# Patient Record
Sex: MALE | Race: OTHER | NOT HISPANIC OR LATINO | URBAN - METROPOLITAN AREA
[De-identification: names, ages, dates, MRNs, and addresses within clinical notes are randomized per-mention and may not be internally consistent; named-entity substitution may affect disease eponyms.]

---

## 2018-06-13 ENCOUNTER — EMERGENCY (EMERGENCY)
Facility: HOSPITAL | Age: 28
LOS: 1 days | Discharge: HOME | End: 2018-06-13
Attending: STUDENT IN AN ORGANIZED HEALTH CARE EDUCATION/TRAINING PROGRAM
Payer: COMMERCIAL

## 2018-06-13 VITALS
SYSTOLIC BLOOD PRESSURE: 156 MMHG | RESPIRATION RATE: 18 BRPM | DIASTOLIC BLOOD PRESSURE: 94 MMHG | HEART RATE: 108 BPM | OXYGEN SATURATION: 100 % | TEMPERATURE: 99 F

## 2018-06-13 VITALS — HEIGHT: 70 IN | WEIGHT: 205.03 LBS

## 2018-06-13 PROCEDURE — 99282 EMERGENCY DEPT VISIT SF MDM: CPT

## 2018-06-13 PROCEDURE — 99053 MED SERV 10PM-8AM 24 HR FAC: CPT

## 2018-06-13 PROCEDURE — 99283 EMERGENCY DEPT VISIT LOW MDM: CPT | Mod: 25

## 2018-06-13 RX ORDER — IBUPROFEN 200 MG
1 TABLET ORAL
Qty: 12 | Refills: 0 | OUTPATIENT
Start: 2018-06-13 | End: 2018-06-15

## 2018-06-13 RX ORDER — METHOCARBAMOL 500 MG/1
1 TABLET, FILM COATED ORAL
Qty: 15 | Refills: 0 | OUTPATIENT
Start: 2018-06-13 | End: 2018-06-17
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
## 2018-06-13 NOTE — ED PROVIDER NOTE - SKIN WOUND TYPE
ABRASION(S)/2 tiny abrasions along the right side of face, two areas of faint erythema to forehead without tenderness
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
## 2018-12-09 ENCOUNTER — EMERGENCY (EMERGENCY)
Facility: HOSPITAL | Age: 28
LOS: 1 days | Discharge: HOME | End: 2018-12-09
Attending: EMERGENCY MEDICINE
Payer: COMMERCIAL

## 2018-12-09 VITALS
HEIGHT: 70 IN | HEART RATE: 77 BPM | WEIGHT: 225.09 LBS | TEMPERATURE: 98 F | RESPIRATION RATE: 20 BRPM | OXYGEN SATURATION: 98 % | DIASTOLIC BLOOD PRESSURE: 87 MMHG | SYSTOLIC BLOOD PRESSURE: 149 MMHG

## 2018-12-09 PROCEDURE — 73130 X-RAY EXAM OF HAND: CPT | Mod: 26,LT

## 2018-12-09 PROCEDURE — 73100 X-RAY EXAM OF WRIST: CPT | Mod: 26,LT,59

## 2018-12-09 PROCEDURE — 73130 X-RAY EXAM OF HAND: CPT

## 2018-12-09 PROCEDURE — 73110 X-RAY EXAM OF WRIST: CPT | Mod: 26,LT,76

## 2018-12-09 PROCEDURE — 73110 X-RAY EXAM OF WRIST: CPT

## 2018-12-09 PROCEDURE — 73090 X-RAY EXAM OF FOREARM: CPT

## 2018-12-09 PROCEDURE — 73100 X-RAY EXAM OF WRIST: CPT

## 2018-12-09 PROCEDURE — 99283 EMERGENCY DEPT VISIT LOW MDM: CPT

## 2018-12-09 PROCEDURE — 73090 X-RAY EXAM OF FOREARM: CPT | Mod: 26,LT

## 2018-12-09 PROCEDURE — 99285 EMERGENCY DEPT VISIT HI MDM: CPT | Mod: 25

## 2018-12-09 PROCEDURE — 25605 CLTX DST RDL FX/EPHYS SEP W/: CPT | Mod: LT

## 2018-12-09 RX ORDER — OXYCODONE AND ACETAMINOPHEN 5; 325 MG/1; MG/1
1 TABLET ORAL ONCE
Qty: 0 | Refills: 0 | Status: DISCONTINUED | OUTPATIENT
Start: 2018-12-09 | End: 2018-12-09

## 2018-12-09 RX ORDER — IBUPROFEN 200 MG
1 TABLET ORAL
Qty: 20 | Refills: 0 | OUTPATIENT
Start: 2018-12-09 | End: 2018-12-13

## 2018-12-09 RX ORDER — IBUPROFEN 200 MG
600 TABLET ORAL ONCE
Qty: 0 | Refills: 0 | Status: COMPLETED | OUTPATIENT
Start: 2018-12-09 | End: 2018-12-09

## 2018-12-09 RX ORDER — IBUPROFEN 200 MG
600 TABLET ORAL ONCE
Qty: 0 | Refills: 0 | Status: DISCONTINUED | OUTPATIENT
Start: 2018-12-09 | End: 2018-12-09

## 2018-12-09 RX ADMIN — Medication 600 MILLIGRAM(S): at 15:57

## 2018-12-09 RX ADMIN — OXYCODONE AND ACETAMINOPHEN 1 TABLET(S): 5; 325 TABLET ORAL at 17:59

## 2018-12-09 NOTE — ED PROVIDER NOTE - CARE PLAN
Principal Discharge DX:	Wrist pain Principal Discharge DX:	Wrist fracture, bilateral Principal Discharge DX:	Wrist fracture, bilateral  Assessment and plan of treatment:	Follow up with orthopedics in 24-48 hours  pain meds as ordered  b/l splints applied

## 2018-12-09 NOTE — ED ADULT NURSE REASSESSMENT NOTE - NS ED NURSE REASSESS COMMENT FT1
cast was removed and resplinted to left wrist, patient received block, unable to assess neuros on fingers again at this time.

## 2018-12-09 NOTE — PROCEDURE NOTE - NSPOSTCAREGUIDE_GEN_A_CORE
Elevate the injured extremity as instructed/Understanding of care for injured extremity verbalized/Keep the cast/splint/dressing clean and dry/Verbal/written post procedure instructions were given to patient/caregiver/Instructed patient/caregiver regarding signs and symptoms of infection

## 2018-12-09 NOTE — ED PROVIDER NOTE - ATTENDING CONTRIBUTION TO CARE
28 year old with hx c/o left wrist pain s/p fall on outstretched hand previously diagnosed with fracture and splinted c/o continued pain and new right hand and wrist pain.  Patient with DROM on exam and tenderness on palpation.  Xray shows new fractures to right hand.  Orthopedics called covering plastics hand consults.  Previous left wrist injury shows dislocation which was reduced by Ortho.  Both extremities were splinted and patient instructed to follow-up as outpatient.

## 2018-12-09 NOTE — PROCEDURE NOTE - NSPERIPVASCEVA_GEN_A_CORE
fingers/toes warm to touch/no cyanosis of extremity/capillary refill time < 2 seconds/paresthesia/swelling

## 2018-12-09 NOTE — ED ADULT NURSE NOTE - OBJECTIVE STATEMENT
patient complaining of right wrist pain status post fall 2 days ago.  Patient has splint to left wrist from [facility], stating numbness to 3/4/5 fingers, no x rays right wrist as per patient

## 2018-12-09 NOTE — ED PROVIDER NOTE - MEDICAL DECISION MAKING DETAILS
b/l wrist pain s/p fall with known L wrist FX in splint: will XR, remove splint, consider ortho eval

## 2018-12-09 NOTE — PROCEDURE NOTE - NSPERIPNEUEVAL_GEN_A_CORE
reports tingling to 1/2/3 digits post reduction decreased sensation throughout secondary to lidocaine hematoma block/Pre-application: Motor, sensory, and vascular responses intact in the injured extremity .

## 2018-12-09 NOTE — ED PROVIDER NOTE - MUSCULOSKELETAL MINIMAL EXAM
L wrist in splint, decreased sensation to digits 1, 2, 3, digits exposed, no erythema, no warmth, no swelling.  R wrist NTTP, to wrist, forearm, elbow, no erythema, no warmth, no swelling, cap refill < 2sec, radial pulse intact, adduction and abduction intact, skin warm and dry

## 2018-12-09 NOTE — ED ADULT TRIAGE NOTE - CHIEF COMPLAINT QUOTE
Friday lt wrist  FX in [facility], has cast.   Now rt arm, hand pain. In [employer] and was training.

## 2018-12-09 NOTE — ED PROVIDER NOTE - OBJECTIVE STATEMENT
This is a 28 year old male with c/o L wrist pain since Friday.  He reports was at the [employer] when landed on out-stretched hand.  He reports went to [facility] and was told had FX started with a "C."  He notes was splinted and began to experience numbness and tingly sensation in digits 1, 2, 3.  He reports now has pain in opposite wrist that radiates to his forearm.  He reports has been taking IBU with minimal relief.  He notes is due to follow up with Urmila tomorrow, however doesn't have an official appointment. This is a 28 year old male with c/o L wrist pain since Friday.  He reports was at the [employer] when landed on out-stretched hand b/l.  He reports went to [facility] and was told had FX started with a "C."  He reports L wrist was placed in gravity with weight, and given lidocaine block and reduced in the ER.  He notes was splinted and began to experience numbness and tingly sensation in digits 1, 2, 3 today.  He reports has pain in opposite wrist since injury x 2 days ago that radiates to his forearm.  He was told R wrist was sprained.  He reports has been taking IBU/percocet with minimal relief.  He notes is due to follow up with Urmila tomorrow, however doesn't have an official appointment.

## 2018-12-09 NOTE — ED ADULT NURSE REASSESSMENT NOTE - NS ED NURSE REASSESS COMMENT FT1
patient status post fall 2 days ago [place] of [place] braced his fall with outstretched hands, was evaluated and told he had a fx of his left wrist, minimal pain relief, stated that he had right wrist pain as well, why he is here for evaluation today.  Patient complaining of numbness to 3rd/4th/5th fingers on left hand with splint on

## 2018-12-09 NOTE — ED PROVIDER NOTE - PROGRESS NOTE DETAILS
XR shows b/l wrist FX, old splint removed and replaced b/l volar splint, ortho contacted spoke to PA who recommends removal of splint and re-application of splint XR shows b/l wrist FX with angulation to L distal radius, old splint removed and replaced b/l volar splint, ortho contacted spoke to KELSY Amaral who recommends removal of splint and re-application of splint b/l, no reduction needed. case d/w hand MD Izaguirre who reviewed films will see in office this week. received phone call from MD Kidd is on call for hand today who reccomends ortho PA attempt to place FX in better allignment, upon re-inspection of XR ? boxer FX, will get dedicated hand XR, pending ortho eval, case signed out to KELSY Caro. received phone call from MD Kidd is on call for hand today who recommends ortho PA attempt to place FX in better alignment, upon re-inspection of XR ? boxer FX, will get dedicated hand XR, pending ortho eval, case signed out to KELSY Caro. pt was seen and examined by ortho PAs. Pt is s/p distal radius reduction. Post reduction xrays confirm successful reduction. Pt stable for discharge home with outpatient follow up with ortho this week. pt was seen and examined by ortho PAs. Pt is s/p distal radius reduction. Post reduction xrays confirm successful reduction, cleared by ortho PAs for discharge with outpatient ortho follow up. Pt stable for discharge home with outpatient follow up with ortho this week.

## 2018-12-09 NOTE — ED ADULT NURSE NOTE - CHIEF COMPLAINT QUOTE
Friday lt wrist  FX in [facility], has cast.   Now rt arm, hand pain. In [employer] and was training.

## 2018-12-09 NOTE — PROCEDURE NOTE - PROCEDURE
<<-----Click on this checkbox to enter Procedure Reduction, wrist, closed, with manipulation  12/09/2018  left distal radius  closed reduction  Active  SNUCCI

## 2018-12-09 NOTE — CONSULT NOTE ADULT - SUBJECTIVE AND OBJECTIVE BOX
Pt Name: LIZ BYERS    MRN: [id number]      Patient is a 28y Male presenting to the emergency department with a chief complaint of b/l wrist pain and numbness in left hand x 2 days. Pt was doing drills in  training and had to land onto his arms when he landed on both hands and felt immediate pain in his wrists. Pt was seen at [facility] and found to have a left distal radius fx that was displaced and was treated by hanging the arm and placing in a volar splint. He came to the ED today complaining of numbness in his left 1st through 3rd digits as well as pain in the right wrist. No other complaints.    HEALTH ISSUES - PROBLEM Dx:  B/L distal radius fxs    REVIEW OF SYSTEMS    General:	No fevers/chills    Musculoskeletal:	 See HPI. No elbow or shoulder pain    Neurological:	See hpi    ROS is otherwise negative.    PAST MEDICAL & SURGICAL HISTORY:  PAST MEDICAL & SURGICAL HISTORY:  No pertinent past medical history  No significant past surgical history      Allergies: No Known Allergies      Medications:     FAMILY HISTORY:  : non-contributory    Social History:     Ambulation: Walking independently     PHYSICAL EXAM:    Vital Signs Last 24 Hrs  T(C): 36.8 (09 Dec 2018 14:53), Max: 36.8 (09 Dec 2018 14:53)  T(F): 98.2 (09 Dec 2018 14:53), Max: 98.2 (09 Dec 2018 14:53)  HR: 77 (09 Dec 2018 14:53) (77 - 77)  BP: 149/87 (09 Dec 2018 14:53) (149/87 - 149/87)  BP(mean): --  RR: 20 (09 Dec 2018 14:53) (20 - 20)  SpO2: 98% (09 Dec 2018 14:53) (98% - 98%)  Daily Height in cm: 177.8 (09 Dec 2018 14:53)    Daily     Appearance: Alert, responsive, in no acute distress.    Neurological: Sensation is grossly intact to light touch.  No focal deficits or weaknesses found.    Skin: no rash on visible skin. Skin is clean, dry and intact. No bleeding. No abrasions. No ulcerations.    Vascular: 2+ distal pulses. Cap refill < 2 sec. No signs of venous insufficiency or stasis. No extremity ulcerations. No cyanosis.     Musculoskeletal:       Left Upper Extremity: Skin intact. +swelling wrist. Mild deformity. +TTP wrist. +ROM fingers. Sensation intact distally with slightly decreased sensation in digits 1-3. Brisk cap refill. Radial pulse 2+. Elbow/shoulder NTTP. +ROM elbow/shoulder.       Right Upper Extremity: Splinted by JENNIFER TIERNEY in volar splint. +ROM fingers. Sensation intact. Radial pulse 2+.    Imaging Studies: < from: Xray Wrist 3 Views, Left (12.09.18 @ 15:55) >  Impression: Acute, comminuted, impacted fracture of the distal radius,   fracture extends into the radiocarpal joints. Status post reduction with   adequate alignment. There is also an ulnar styloid fracture.    < end of copied text >  < from: Xray Wrist 3 Views, Right (12.09.18 @ 15:56) >  Impression: Acute minimally displaced fractures involving the distal   radius and the ulnar styloid. The comminuted nondisplaced fracture of the   distal radius extends into the radiocarpal joints. No dislocation.    < end of copied text >      A/P:  Pt is a  28y Male with b/l distal radius fxs    PLAN:   * See procedure note  hematoma block with 6cc 1% lidocaine given after sterile prep with chloroprep. Closed reduction left distal radius and application of sugar tong splint.  NWB BUE  splint care  elevate BUE  F/U with Dr WEST this week  D/W Dr Salazar

## 2018-12-10 ENCOUNTER — APPOINTMENT (OUTPATIENT)
Dept: ORTHOPEDIC SURGERY | Facility: CLINIC | Age: 28
End: 2018-12-10
Payer: OTHER MISCELLANEOUS

## 2018-12-10 VITALS
SYSTOLIC BLOOD PRESSURE: 138 MMHG | DIASTOLIC BLOOD PRESSURE: 87 MMHG | HEART RATE: 68 BPM | BODY MASS INDEX: 32.21 KG/M2 | WEIGHT: 225 LBS | HEIGHT: 70 IN

## 2018-12-10 DIAGNOSIS — Z78.9 OTHER SPECIFIED HEALTH STATUS: ICD-10-CM

## 2018-12-10 PROCEDURE — 99204 OFFICE O/P NEW MOD 45 MIN: CPT | Mod: 57

## 2018-12-10 PROCEDURE — 25600 CLTX DST RDL FX/EPHYS SEP WO: CPT | Mod: 50

## 2018-12-11 PROBLEM — Z78.9 DOES NOT USE ILLICIT DRUGS: Status: ACTIVE | Noted: 2018-12-10

## 2018-12-18 ENCOUNTER — RX RENEWAL (OUTPATIENT)
Age: 28
End: 2018-12-18

## 2018-12-21 ENCOUNTER — OTHER (OUTPATIENT)
Age: 28
End: 2018-12-21

## 2018-12-24 ENCOUNTER — OTHER (OUTPATIENT)
Age: 28
End: 2018-12-24

## 2018-12-28 ENCOUNTER — APPOINTMENT (OUTPATIENT)
Dept: ORTHOPEDIC SURGERY | Facility: CLINIC | Age: 28
End: 2018-12-28
Payer: OTHER MISCELLANEOUS

## 2018-12-28 VITALS
WEIGHT: 225 LBS | DIASTOLIC BLOOD PRESSURE: 87 MMHG | BODY MASS INDEX: 32.21 KG/M2 | SYSTOLIC BLOOD PRESSURE: 149 MMHG | HEIGHT: 70 IN | HEART RATE: 80 BPM

## 2018-12-28 PROCEDURE — 99024 POSTOP FOLLOW-UP VISIT: CPT

## 2018-12-28 PROCEDURE — 29075 APPL CST ELBW FNGR SHORT ARM: CPT | Mod: 58,LT

## 2018-12-28 PROCEDURE — 73110 X-RAY EXAM OF WRIST: CPT | Mod: RT

## 2018-12-31 ENCOUNTER — OTHER (OUTPATIENT)
Age: 28
End: 2018-12-31

## 2019-01-07 ENCOUNTER — RX RENEWAL (OUTPATIENT)
Age: 29
End: 2019-01-07

## 2019-01-15 ENCOUNTER — APPOINTMENT (OUTPATIENT)
Dept: ORTHOPEDIC SURGERY | Facility: CLINIC | Age: 29
End: 2019-01-15
Payer: OTHER MISCELLANEOUS

## 2019-01-15 ENCOUNTER — OTHER (OUTPATIENT)
Age: 29
End: 2019-01-15

## 2019-01-15 DIAGNOSIS — S52.601D UNSPECIFIED FRACTURE OF THE LOWER END OF RIGHT RADIUS, SUBSEQUENT ENCOUNTER FOR CLOSED FRACTURE WITH ROUTINE HEALING: ICD-10-CM

## 2019-01-15 DIAGNOSIS — S52.501D UNSPECIFIED FRACTURE OF THE LOWER END OF RIGHT RADIUS, SUBSEQUENT ENCOUNTER FOR CLOSED FRACTURE WITH ROUTINE HEALING: ICD-10-CM

## 2019-01-15 DIAGNOSIS — S52.502D UNSPECIFIED FRACTURE OF THE LOWER END OF RIGHT RADIUS, SUBSEQUENT ENCOUNTER FOR CLOSED FRACTURE WITH ROUTINE HEALING: ICD-10-CM

## 2019-01-15 DIAGNOSIS — S52.602D UNSPECIFIED FRACTURE OF THE LOWER END OF RIGHT RADIUS, SUBSEQUENT ENCOUNTER FOR CLOSED FRACTURE WITH ROUTINE HEALING: ICD-10-CM

## 2019-01-15 PROCEDURE — 73110 X-RAY EXAM OF WRIST: CPT | Mod: 50

## 2019-01-15 PROCEDURE — 99024 POSTOP FOLLOW-UP VISIT: CPT
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
## 2019-01-15 NOTE — REASON FOR VISIT
[Follow-Up Visit] : a follow-up visit for [Parent] : parent [Family Member] : family member [FreeTextEntry2] : Bilateral wrist fracture follow up

## 2019-01-15 NOTE — ASSESSMENT
[FreeTextEntry1] : Patient is 28yM with bilateral distal radius fractures. Compared to previous xrays, it appears that the right wrist fracture has loss some anatomic alignment with displacement of the lunate facet. The left distal radius fracture appears within acceptable anatomic alignment. Patient is referred to hand specialist for further evaluation if surgical intervention is needed for the right wrist. Both wrists should remain in removable wrist splints. All questions answered and patient agrees with assessment/plan.

## 2019-01-15 NOTE — PHYSICAL EXAM
[de-identified] : Physical Exam:\par General: Well appearing, no acute distress, A&O\par Neurologic: A&Ox3, No focal deficits\par Head: NCAT without abrasions, lacerations, or ecchymosis to head, face, or scalp\par Respiratory: Equal chest wall expansion bilaterally, no accessory muscle use\par Lymphatic: No lymphadenopathy palpated\par Skin: Warm and dry\par Psychiatric: Normal mood and affect\par \par LUE:\par skin intact, no ecchymosis\par +ttp at fracture site\par ain/pin/r/u/m intact, silt c5-t1 compartments soft\par decreased flex/ext/sup/pro\par \par RUE:\par skin intact, no ecchymosis\par +ttp at fracture site\par ain/pin/r/u/m intact, silt c5-t1\par decreased flex/ext/pro/sup at wrist [de-identified] : Xrays taken today of bilateral wrists demonstrate appropriate interval fracture healing of left distal radius in acceptable near anatomic alignment. Radiographs of right wrist demonstrate displaced lunate facet fracture.

## 2019-01-15 NOTE — HISTORY OF PRESENT ILLNESS
[de-identified] : Pt is 28yM presenting for follow up of bilateral distal radius fx. pt is progressing well with minimal complains of discomfort and pain. Out of cast and splints state shis wrists feel stiff. Denies numbness or tingling [Bending] : worsened by bending [Lifting] : worsened by lifting [Recumbency] : relieved by recumbency [Rest] : relieved by rest

## 2019-01-23 ENCOUNTER — OUTPATIENT (OUTPATIENT)
Dept: OUTPATIENT SERVICES | Facility: HOSPITAL | Age: 29
LOS: 1 days | End: 2019-01-23
Payer: COMMERCIAL

## 2019-01-23 VITALS
DIASTOLIC BLOOD PRESSURE: 93 MMHG | OXYGEN SATURATION: 98 % | WEIGHT: 223.99 LBS | SYSTOLIC BLOOD PRESSURE: 138 MMHG | HEART RATE: 100 BPM | TEMPERATURE: 99 F | RESPIRATION RATE: 16 BRPM

## 2019-01-23 DIAGNOSIS — S52.502A UNSPECIFIED FRACTURE OF THE LOWER END OF LEFT RADIUS, INITIAL ENCOUNTER FOR CLOSED FRACTURE: ICD-10-CM

## 2019-01-23 DIAGNOSIS — Z01.818 ENCOUNTER FOR OTHER PREPROCEDURAL EXAMINATION: ICD-10-CM

## 2019-01-23 LAB
HCT VFR BLD CALC: 45.1 % — SIGNIFICANT CHANGE UP (ref 39–50)
HGB BLD-MCNC: 15 G/DL — SIGNIFICANT CHANGE UP (ref 13–17)
HIV 1+2 AB+HIV1 P24 AG SERPL QL IA: SIGNIFICANT CHANGE UP
MCHC RBC-ENTMCNC: 28.3 PG — SIGNIFICANT CHANGE UP (ref 27–34)
MCHC RBC-ENTMCNC: 33.3 GM/DL — SIGNIFICANT CHANGE UP (ref 32–36)
MCV RBC AUTO: 85.1 FL — SIGNIFICANT CHANGE UP (ref 80–100)
NRBC # BLD: 0 /100 WBCS — SIGNIFICANT CHANGE UP (ref 0–0)
PLATELET # BLD AUTO: 263 K/UL — SIGNIFICANT CHANGE UP (ref 150–400)
RBC # BLD: 5.3 M/UL — SIGNIFICANT CHANGE UP (ref 4.2–5.8)
RBC # FLD: 12.5 % — SIGNIFICANT CHANGE UP (ref 10.3–14.5)
WBC # BLD: 7.95 K/UL — SIGNIFICANT CHANGE UP (ref 3.8–10.5)
WBC # FLD AUTO: 7.95 K/UL — SIGNIFICANT CHANGE UP (ref 3.8–10.5)

## 2019-01-23 PROCEDURE — G0463: CPT

## 2019-01-23 PROCEDURE — 87389 HIV-1 AG W/HIV-1&-2 AB AG IA: CPT

## 2019-01-23 PROCEDURE — 85027 COMPLETE CBC AUTOMATED: CPT

## 2019-01-23 PROCEDURE — 36415 COLL VENOUS BLD VENIPUNCTURE: CPT

## 2019-01-23 NOTE — H&P PST ADULT - PROBLEM SELECTOR PLAN 2
No medical clearance needed as per surgeon. CBC and HIV ordered. Pre-op instructions and surgical scrubs given and pt verbalized understanding.

## 2019-01-23 NOTE — H&P PST ADULT - PMH
Unspecified fracture of the lower end of left radius, initial encounter for closed fracture Unspecified fracture of the lower end of left radius, initial encounter for closed fracture    Unspecified fracture of the lower end of right radius, initial encounter for closed fracture

## 2019-01-23 NOTE — H&P PST ADULT - ASSESSMENT
29yo male with unspecified fracture of the lower end of left radius, initial encounter for closed fracture. 29yo male with unspecified fracture of the lower end of left radius, initial encounter for closed fracture.     ADDENDUM 1/24/19- 28 year old male with Unspecified Fracture of the lower end of RIGHT Radius, Initial Encounter for Closed Fracture - (AIDA FERRO-C)

## 2019-01-23 NOTE — H&P PST ADULT - RS GEN PE MLT RESP DETAILS PC
respirations non-labored/normal/airway patent/clear to auscultation bilaterally/good air movement/breath sounds equal

## 2019-01-23 NOTE — H&P PST ADULT - HISTORY OF PRESENT ILLNESS
29yo male with no PMH here for PST. Pt s/p fall while in training at work on 12/7/18. Pt was initially seen at  where left wrist fracture was diagnosed and was seen again at [facility] on 12/9/18. Pt s/p reduction and wearing brace to left wrist at all times. Pt complaining of intermittent left wrist pain and rates pain to be 6/10 and took Ibuprofen PRN last week with moderate pain relief. Pt reports to decreased ROM of left wrist but denies left hand neuralgia. Pt is left hand dominant. Pt electing for open reduction internal fixation left distal radius fracture with mini fluoroscopy on 1/25/19. 29yo male with no PMH here for PST. Pt s/p fall while in training at work on 12/7/18. Pt was initially seen at [facility] where left wrist fracture was diagnosed and was seen again at [facility] on 12/9/18. Pt s/p reduction and wearing brace to left wrist at all times. Pt complaining of intermittent left wrist pain and rates pain to be 6/10 and took Ibuprofen PRN last week with moderate pain relief. Pt reports to decreased ROM of left wrist but denies left hand neuralgia. Pt is left hand dominant. Pt electing for open reduction internal fixation left distal radius fracture with mini fluoroscopy on 1/25/19.     ADDENDUM: 1/24/19 - Received call from Dr Pham Surgical Coordinator [facility]- when patient was initially injured while training at work he also had RIGHT wrist xrays obtained while at [place]- RIGHT Wrist Xrays showed Minimally Displaced FX involving RIGHT Distal Radius and Ulnar Styloid- as per Dr Izaguirre pt was seen in office RIGHT Wrist FX not healing well -  therefore PROCEDURE CHANGED to Open Reduction Internal Fixation  BILATERAL Distal Radius Fractures W/Mini FLuoroscopy scheduled for 1/25/19 - AIAD STEPHENS

## 2019-01-23 NOTE — H&P PST ADULT - PROBLEM SELECTOR PLAN 3
ADDENDUM 1/24/19 - Open Reduction Internal Fixation BILATERAL Distal Radius Fractures W/Mini Fluoroscopy with Dr Jakob Izaguirre on 1/25/19 - (See Above DX LEFT Radius Fracture) -  AIDA Mackenzie ANP-C

## 2019-01-23 NOTE — H&P PST ADULT - MUSCULOSKELETAL COMMENTS
Left wrist - decreased ROM, (+) tenderness to palpation, (+) minimal edema of left hand, no erythema Pt also with acute minimally displaced fractures involving the distal radius and ulnar styloid of right wrist sustained with fall on 12/7/18.

## 2019-01-23 NOTE — H&P PST ADULT - NSANTHOSAYNRD_GEN_A_CORE
No. CHHAYA screening performed.  STOP BANG Legend: 0-2 = LOW Risk; 3-4 = INTERMEDIATE Risk; 5-8 = HIGH Risk

## 2019-01-23 NOTE — H&P PST ADULT - PROBLEM SELECTOR PLAN 1
Open reduction internal fixation left distal radius fracture with mini fluoroscopy on 1/25/19. Open reduction internal fixation left distal radius fracture with mini fluoroscopy on 1/25/19. -    ADDENDUM 1/25/19 - Open Reduction Internal Fixation BILATERAL Distal Radius Fractures W/Mini Fluoroscopy with Dr Jakob Izaguirre on 1/25/19 - (See Below DX RIGHT Radius Fracture) - AIDA FERRO-C

## 2019-01-24 ENCOUNTER — TRANSCRIPTION ENCOUNTER (OUTPATIENT)
Age: 29
End: 2019-01-24

## 2019-01-24 DIAGNOSIS — S52.501A UNSPECIFIED FRACTURE OF THE LOWER END OF RIGHT RADIUS, INITIAL ENCOUNTER FOR CLOSED FRACTURE: ICD-10-CM

## 2019-01-25 ENCOUNTER — RESULT REVIEW (OUTPATIENT)
Age: 29
End: 2019-01-25

## 2019-01-25 ENCOUNTER — OUTPATIENT (OUTPATIENT)
Dept: OUTPATIENT SERVICES | Facility: HOSPITAL | Age: 29
LOS: 1 days | End: 2019-01-25
Payer: COMMERCIAL

## 2019-01-25 VITALS
HEART RATE: 97 BPM | RESPIRATION RATE: 18 BRPM | SYSTOLIC BLOOD PRESSURE: 134 MMHG | OXYGEN SATURATION: 100 % | DIASTOLIC BLOOD PRESSURE: 77 MMHG | TEMPERATURE: 98 F

## 2019-01-25 VITALS
TEMPERATURE: 98 F | OXYGEN SATURATION: 99 % | WEIGHT: 223.99 LBS | DIASTOLIC BLOOD PRESSURE: 94 MMHG | SYSTOLIC BLOOD PRESSURE: 147 MMHG | RESPIRATION RATE: 12 BRPM | HEART RATE: 105 BPM | HEIGHT: 70 IN

## 2019-01-25 DIAGNOSIS — Z01.818 ENCOUNTER FOR OTHER PREPROCEDURAL EXAMINATION: ICD-10-CM

## 2019-01-25 DIAGNOSIS — S52.502A UNSPECIFIED FRACTURE OF THE LOWER END OF LEFT RADIUS, INITIAL ENCOUNTER FOR CLOSED FRACTURE: ICD-10-CM

## 2019-01-25 PROCEDURE — 88305 TISSUE EXAM BY PATHOLOGIST: CPT

## 2019-01-25 PROCEDURE — 25607 OPTX DST RD XARTC FX/EPI SEP: CPT | Mod: AS,59

## 2019-01-25 PROCEDURE — 64771 SEVER CRANIAL NERVE: CPT | Mod: AS

## 2019-01-25 PROCEDURE — 88305 TISSUE EXAM BY PATHOLOGIST: CPT | Mod: 26

## 2019-01-25 PROCEDURE — 25608 OPTX DST RD XART FX/EPI SEP2: CPT | Mod: AS

## 2019-01-25 PROCEDURE — 25608 OPTX DST RD XART FX/EPI SEP2: CPT | Mod: RT

## 2019-01-25 PROCEDURE — 25607 OPTX DST RD XARTC FX/EPI SEP: CPT | Mod: LT

## 2019-01-25 PROCEDURE — C1713: CPT

## 2019-01-25 PROCEDURE — 76000 FLUOROSCOPY <1 HR PHYS/QHP: CPT

## 2019-01-25 RX ORDER — SODIUM CHLORIDE 9 MG/ML
1000 INJECTION, SOLUTION INTRAVENOUS
Qty: 0 | Refills: 0 | Status: DISCONTINUED | OUTPATIENT
Start: 2019-01-25 | End: 2019-01-25

## 2019-01-25 RX ORDER — HYDROMORPHONE HYDROCHLORIDE 2 MG/ML
0.5 INJECTION INTRAMUSCULAR; INTRAVENOUS; SUBCUTANEOUS
Qty: 0 | Refills: 0 | Status: DISCONTINUED | OUTPATIENT
Start: 2019-01-25 | End: 2019-01-25

## 2019-01-25 RX ORDER — OXYCODONE HYDROCHLORIDE 5 MG/1
5 TABLET ORAL ONCE
Qty: 0 | Refills: 0 | Status: DISCONTINUED | OUTPATIENT
Start: 2019-01-25 | End: 2019-01-25

## 2019-01-25 RX ORDER — ONDANSETRON 8 MG/1
4 TABLET, FILM COATED ORAL ONCE
Qty: 0 | Refills: 0 | Status: DISCONTINUED | OUTPATIENT
Start: 2019-01-25 | End: 2019-01-25

## 2019-01-25 RX ORDER — CEFAZOLIN SODIUM 1 G
2000 VIAL (EA) INJECTION ONCE
Qty: 0 | Refills: 0 | Status: COMPLETED | OUTPATIENT
Start: 2019-01-25 | End: 2019-01-25

## 2019-01-25 RX ADMIN — HYDROMORPHONE HYDROCHLORIDE 0.5 MILLIGRAM(S): 2 INJECTION INTRAMUSCULAR; INTRAVENOUS; SUBCUTANEOUS at 17:00

## 2019-01-25 RX ADMIN — OXYCODONE HYDROCHLORIDE 5 MILLIGRAM(S): 5 TABLET ORAL at 16:39

## 2019-01-25 RX ADMIN — SODIUM CHLORIDE 100 MILLILITER(S): 9 INJECTION, SOLUTION INTRAVENOUS at 16:10

## 2019-01-25 RX ADMIN — SODIUM CHLORIDE 75 MILLILITER(S): 9 INJECTION, SOLUTION INTRAVENOUS at 09:34

## 2019-01-25 RX ADMIN — HYDROMORPHONE HYDROCHLORIDE 0.5 MILLIGRAM(S): 2 INJECTION INTRAMUSCULAR; INTRAVENOUS; SUBCUTANEOUS at 16:07

## 2019-01-25 RX ADMIN — OXYCODONE HYDROCHLORIDE 5 MILLIGRAM(S): 5 TABLET ORAL at 17:00

## 2019-01-25 RX ADMIN — HYDROMORPHONE HYDROCHLORIDE 0.5 MILLIGRAM(S): 2 INJECTION INTRAMUSCULAR; INTRAVENOUS; SUBCUTANEOUS at 16:22

## 2019-01-25 NOTE — BRIEF OPERATIVE NOTE - PRE-OP DX
Closed fracture of lower end of radius  01/25/2019  right  Active  Martin Almodovar  Closed fracture of lower end of radius  01/25/2019  left  Active  Martin Almodovar

## 2019-01-25 NOTE — ASU PATIENT PROFILE, ADULT - PMH
Unspecified fracture of the lower end of left radius, initial encounter for closed fracture    Unspecified fracture of the lower end of right radius, initial encounter for closed fracture

## 2019-01-25 NOTE — BRIEF OPERATIVE NOTE - POST-OP DX
Closed fracture of lower end of radius  01/25/2019  left  Active  Martin Almodovar  Closed fracture of lower end of radius  01/25/2019  right  Active  Martin Almodovar

## 2019-01-25 NOTE — ASU PATIENT PROFILE, ADULT - TEACHING/LEARNING FACTORS INFLUENCE READINESS TO LEARN
"Hospital Medicine   Progress Note    Patient: Krissy Marino [id number]  Date of Service: 12/24/2018  Team: [facility] [place] [facility]OS[facility] MED D Pamela Anders MD  Hospital Day: 12  Admission Date: 12/12/2018  SUMAN: 12/24/2018  Code status: Full Code    Admission CC: Cellulitis (Patient transferred to [facility] for further evaluation of unresolved cellulitis of left foot. EMS also reports "low blood cell count" as well from patient rehab ([facility]). Patient A&Ox4 and following commands. )    Principal Problem:  Osteomyelitis of left tibia    24-Hour Course / Overnight Events     No significant events reported by Nursing.    Interval HPI / Review of Systems     Patient reports no new compalints.    Review of Systems   Constitutional: Negative for fever.   Respiratory: Negative for shortness of breath.        Physical Examination     Temp:  [98.2 °F (36.8 °C)-98.5 °F (36.9 °C)]   Pulse:  [75-83]   Resp:  [13-17]   BP: (138-150)/(65-67)   SpO2:  [93 %-95 %]      Temp: 98.2 °F (36.8 °C) (12/24/18 0808)  Pulse: 83 (12/24/18 0808)  Resp: 17 (12/24/18 0808)  BP: (!) 141/66 (12/24/18 0808)  SpO2: 95 % (12/24/18 0808)    Intake/Output Summary (Last 24 hours) at 12/24/2018 0942  Last data filed at 12/24/2018 0600  Gross per 24 hour   Intake 740 ml   Output --   Net 740 ml     I have personally reviewed the recorded Intake/Output for the past 24 hours:  Unmeasured Output noted.    Body mass index is 39.11 kg/m².  Physical Exam   Constitutional:  Non-toxic appearance.   Eyes: Conjunctivae and lids are normal.   Cardiovascular: S1 normal and S2 normal.   Pulmonary/Chest: Effort normal. She has decreased breath sounds.   Abdominal: Soft. Bowel sounds are normal.   Musculoskeletal: She exhibits edema.        Left foot: There is swelling and crepitus.   Neurological: She is alert. She is not disoriented.       Data     Lab, Imaging, and Diagnostic results from 12/24/2018 were reviewed.    Significant Results:    Recent Labs   Lab 12/21/18  0848 " 12/22/18  0525 12/23/18  0448 12/24/18  0358   WBC 8.25 8.56 7.61 7.43   HGB 9.0* 8.8* 9.4* 8.7*   HCT 27.5* 27.7* 28.8* 27.0*   * 140* 145* 137*     Recent Labs   Lab 12/19/18  0733 12/20/18  0518  12/22/18  0525 12/23/18  0448 12/24/18  0358   * 131*   < > 132* 133* 136   K 4.1 3.9   < > 3.8 4.0 4.3    100   < > 102 104 108   CO2 25 21*   < > 23 22* 21*   BUN 35* 39*   < > 48* 51* 50*   CREATININE 1.0 1.1   < > 1.4 1.4 1.2   * 162*   < > 167* 210* 155*   CALCIUM 8.7 8.4*   < > 8.3* 8.3* 8.6*   MG 1.2* 2.2  --   --  1.9 1.8   PHOS 3.0  --   --   --  2.9 2.5*   ALKPHOS 202* 192*   < > 198* 190* 178*   ALT 58* 61*   < > 51* 39 33   AST 73* 72*   < > 42* 27 22   ALBUMIN 1.7* 1.7*   < > 1.8* 1.8* 1.7*   PROT 5.4* 5.1*   < > 5.4* 5.4* 5.1*   BILITOT 0.5 0.5   < > 0.5 0.4 0.4    < > = values in this interval not displayed.     Recent Labs   Lab 12/22/18  2133 12/23/18  0741 12/23/18  1251 12/23/18  1713 12/23/18  2148 12/24/18  0816   POCTGLUCOSE 289* 209* 263* 238* 295* 166*     A1C:   Recent Labs   Lab 12/12/18  1852   HGBA1C 6.9*       Medications     Scheduled Medications:    allopurinol 300 mg Oral Daily   aspirin 81 mg Oral Daily   atorvastatin 40 mg Oral Daily   ceFEPime (MAXIPIME) IVPB 2 g Intravenous Q24H   clopidogrel 75 mg Oral Daily   duke's soln (benadryl 30 mL, mylanta 30 mL, lidocane 30 mL, nystatin 30 mL) 120 mL 10 mL Oral QID   enoxaparin 30 mg Subcutaneous Daily   famotidine 20 mg Oral Daily   fluticasone-vilanterol 1 puff Inhalation Daily   insulin detemir U-100 4 Units Subcutaneous Daily   levothyroxine 75 mcg Oral Before breakfast   metoprolol succinate 50 mg Oral Daily   metroNIDAZOLE 500 mg Oral Q8H   miconazole nitrate 2%  Topical (Top) BID   multivitamin 1 tablet Oral Daily   polyethylene glycol 17 g Oral Daily     PRN: sodium chloride, acetaminophen, dextrose 50%, dextrose 50%, glucagon (human recombinant), glucose, glucose, insulin aspart U-100, ondansetron,  senna-docusate 8.6-50 mg, sodium chloride 0.9%  Infusions:     Problem-Based Assessment and Plan     Overview: 78 year old female with a history of DM2, CAD (s/p PCI 7/2018), PVD and s/p bilateral total knee arthroplasties (years prior) who is admitted for left lower extremity cellulitis and osteomyelitis. Recently hospitalized at OSH with a lower extremity cellulitis which started after a left ankle sprain (11/26-12/5) which didn't improve with 14-day outpt course of ceftriaxone. Recently told she has severe PVD which requires stent placement, but her recent cardiac stent placement presents a contraindication for at least a year, which prevented good wound healing. At [facility], CT showed left sided abscesses distal to the fibular head and osteomyelitis that extended proximal to the ankle joint. Orthopedics recommended AKA, which family and pt refused for antibiotics alone with understanding of possible complications from infection/sepsis. Pt's antibiotics include cefepime (pseudomonas coverage, switched from ceftriaxone), flagyl (gas noted on CT), and vancomycin (held for ALVARO that developed on 12/21). Received 1 U PRBC with appropriate response (12/16).    Active Hospital Problems    Diagnosis  POA    *Osteomyelitis of left tibia [M86.9]  Yes    Pressure injury of coccygeal region, stage 2 [L89.152]  Yes    Alteration in skin integrity [R23.9]  Yes    Gas gangrene of Left lower extremity [A48.0]  Yes    Moderate protein-calorie malnutrition [E44.0]  Yes    Hypoalbuminemia due to protein-calorie malnutrition [E46]  Yes    Cellulitis [L03.90]  Yes    Anemia [D64.9]  Yes    Pressure injury of buttock, stage 2 [L89.302]  Yes    Chronic osteomyelitis of left tibia with draining sinus [M86.462]  Yes    Benign hypertensive heart disease with HF (heart failure) [I11.0]  Yes    Cellulitis of left lower extremity [L03.116]  Yes    Macrocytic anemia [D53.9]  Yes    Renal insufficiency [N28.9]  No    Preoperative  cardiovascular examination [Z01.810]  Not Applicable    Peripheral arterial disease [I73.9]  Yes    Coronary artery disease involving native coronary artery of native heart with unstable angina pectoris [I25.110]  Yes    Chronic diastolic heart failure [I50.32]  Yes    Obesity (BMI 30-39.9) [E66.9]  Yes    Bilateral leg edema [R60.0]  Yes    Hypothyroidism [E03.9]  Yes    Dyslipidemia [E78.5]  Yes    Hypertension [I10]  Yes    Type 2 diabetes mellitus, without long-term current use of insulin [E11.9]  Yes      Resolved Hospital Problems    Diagnosis Date Resolved POA    Delirium [R41.0] 12/21/2018 No    Hyperkalemia [E87.5] 12/17/2018 No    Hypomagnesemia [E83.42] 12/21/2018 No    Sepsis due to cellulitis [L03.90, A41.9] 12/21/2018 Yes       Problems addressed today:    Osteomyelitis of left tibia  Gas gangrene of Left lower extremity  Cellulitis of left lower extremity  · PICC line in place  · Vancomycin held (ALVARO)  · Cefepime 2g IV q12   · Flagyl 500 mg PO TID  · Re-image LLE in 4-5 weeks (or if worsening signs/symptoms of infection). Antibiotics for 6 weeks at this time (total therapy unclear at this time). Weekly ESR, CRP, CBC, CMP, and Vanc trough for LLE cellulitis, myositis and multiloculated abscesses in deep soft tissue - already complicated by GBS bacteremia. Limb-salvage unlikely, AKA recommended for source control as intravenous antibiotics will not appropriately treat abscess, particularly in the setting of PVD.   · Vancomycin level trending down, Goal trough 15 - 20. Plan - Restart Vanc 1.5g IV q24 when stable. Continue Cefepime 2g IV q12 and Flagyl 500mg po TID as gas seen on CT scan.  · Re-dosed vancomycin 1 g x 1 on 12/24     Renal insufficiency  · Cr downtrending, continue holding Lasix and ACEi (12/23)     Oral candidiasis  · Duke's solution (12/22)    Peripheral arterial disease  · Non-compressible MARIMAR with normal doppler waveforms and palpable pedal pulses  · Continue ASA, Plavix,  atorvastatin     Coronary artery disease involving native coronary artery of native heart with unstable angina pectoris  Chronic diastolic heart failure   Dyslipidemia   Hypertension  · Underwent LHC and PCI in 7/2018  · lower BPs noted, meds adjusted (12/18)  · Continue ASA, plavix, toprol 50 mg QD  · Continue atorvastatin  · Amlodipine stopped  · Losartan 50 mg held for ALVARO that developed on 12/21 (had been reduced to 50 mg from 100 mg for elevated potassium)  · Lasix held for ALVARO, (had been reduced from 40 PO BID to QD)     Type 2 diabetes mellitus, without long-term current use of insulin  · Home oral medications held  · SC insulin, POCT  · detemir 4 units daily (started 12/21)     Coccyx pressure injury, stage 2  · Wound care following    Debility  · PT/OT following    Hypothyroidism   · continue Synthroid     Moderate protein calorie malnutrition  Severe hypoalbuminemia  · Low prealbumin and albumin  · boost TID, beneprotein TID, MVI  · Swallow study, regular diet (12/17)    Macrocytic Anemia  Anemia of chronic inflammation   · Ferritin ? (12/16)  · Stable, monitor  · B12 and folate wnl  · MVI    Hypomagnesemia - resolved  · Replaced    Hyperkalemia - resolved  · K 5.4, given 500 mL LR bolus, resolved (12/16)      HIGH RISK CONDITION(S):   Patient has a condition that poses threat to life and bodily function: Acute Renal Failure  Patient is currently on drug therapy requiring intensive monitoring for toxicity: Vancomycin   Inpatient Checklist:  Diet:  Diet diabetic [facility]; 2000 Calorie; Cardiac (Low Na/Chol)  DVT Prophylaxis: Anticoagulation     Anticoagulants   Medication Route Frequency    enoxaparin injection 30 mg Subcutaneous Daily     GI Prophylaxis: Not indicated  Lines/ Drains/ Airways:   Central Line Indication: med administration  Type: PICC  Urinary Catheter Indicated: Patient Does Not Have Urinary Catheter  Other Lines/Tubes/Drains:    Disposition:   Skilled Nursing Facility    Follow up  plan:      Provider  Pamela Anders MD  [facility] [place]  Department of Hospital Medicine  Contact Information:   Spectralink # [id number]  Pager [phone]    I personally scribed for Pamela Anders MD on 12/24/2018 at 9:36 AM. Electronically signed by edson Harp on 12/24/2018 at 9:36 AM     none

## 2019-01-25 NOTE — BRIEF OPERATIVE NOTE - PROCEDURE
+ hip fx. Ortho in the ED evaluating the pt <<-----Click on this checkbox to enter Procedure Open reduction and internal fixation (ORIF) of fracture of both distal radii  01/25/2019    Active  [id number] Pt seen by ortho, requesting repeat imaging as the first set were poor for planning D/w PCP Dr Chester - pt w/ hx CLL. If pt needs pre-op clearance for tomorrow, call Dr EL Clayton. He will in the hospital next week Pt seen by ortho, admit to [facility]

## 2020-01-22 NOTE — ED ADULT NURSE NOTE - CCCP TRG CHIEF CMPLNT
Next appt 7-15-20  Last appt 1-13-20    Refill request for   Disp Refills Start End    glipiZIDE (GLUCOTROL ER) 10 MG 24 hr tablet 180 tablet 1 4/12/2019     Sig: TAKE 1 TABLET BY MOUTH TWO  TIMES DAILY      LABS ARE UP TO DATE.    Refilled per standing med protocol.     Rt arm/casted lt wrist/wrist pain/injury

## 2020-01-28 ENCOUNTER — APPOINTMENT (OUTPATIENT)
Dept: INTERNAL MEDICINE | Facility: CLINIC | Age: 30
End: 2020-01-28
Payer: COMMERCIAL

## 2020-01-28 ENCOUNTER — NON-APPOINTMENT (OUTPATIENT)
Age: 30
End: 2020-01-28

## 2020-01-28 ENCOUNTER — TRANSCRIPTION ENCOUNTER (OUTPATIENT)
Age: 30
End: 2020-01-28

## 2020-01-28 VITALS
RESPIRATION RATE: 12 BRPM | SYSTOLIC BLOOD PRESSURE: 123 MMHG | BODY MASS INDEX: 33.21 KG/M2 | HEART RATE: 85 BPM | WEIGHT: 232 LBS | DIASTOLIC BLOOD PRESSURE: 70 MMHG | HEIGHT: 70 IN

## 2020-01-28 DIAGNOSIS — Z82.49 FAMILY HISTORY OF ISCHEMIC HEART DISEASE AND OTHER DISEASES OF THE CIRCULATORY SYSTEM: ICD-10-CM

## 2020-01-28 DIAGNOSIS — Z83.438 FAMILY HISTORY OF OTHER DISORDER OF LIPOPROTEIN METABOLISM AND OTHER LIPIDEMIA: ICD-10-CM

## 2020-01-28 DIAGNOSIS — S52.601A UNSPECIFIED FRACTURE OF THE LOWER END OF RIGHT RADIUS, INITIAL ENCOUNTER FOR CLOSED FRACTURE: ICD-10-CM

## 2020-01-28 DIAGNOSIS — S52.602A UNSPECIFIED FRACTURE OF THE LOWER END OF RIGHT RADIUS, INITIAL ENCOUNTER FOR CLOSED FRACTURE: ICD-10-CM

## 2020-01-28 DIAGNOSIS — S52.502A UNSPECIFIED FRACTURE OF THE LOWER END OF RIGHT RADIUS, INITIAL ENCOUNTER FOR CLOSED FRACTURE: ICD-10-CM

## 2020-01-28 DIAGNOSIS — S52.501A UNSPECIFIED FRACTURE OF THE LOWER END OF RIGHT RADIUS, INITIAL ENCOUNTER FOR CLOSED FRACTURE: ICD-10-CM

## 2020-01-28 DIAGNOSIS — B27.00 GAMMAHERPESVIRAL MONONUCLEOSIS W/OUT COMPLICATION: ICD-10-CM

## 2020-01-28 PROCEDURE — 90715 TDAP VACCINE 7 YRS/> IM: CPT

## 2020-01-28 PROCEDURE — G0442 ANNUAL ALCOHOL SCREEN 15 MIN: CPT

## 2020-01-28 PROCEDURE — 90674 CCIIV4 VAC NO PRSV 0.5 ML IM: CPT

## 2020-01-28 PROCEDURE — 93000 ELECTROCARDIOGRAM COMPLETE: CPT | Mod: 59

## 2020-01-28 PROCEDURE — 90472 IMMUNIZATION ADMIN EACH ADD: CPT

## 2020-01-28 PROCEDURE — 99385 PREV VISIT NEW AGE 18-39: CPT | Mod: 25

## 2020-01-28 PROCEDURE — G0008: CPT

## 2020-01-28 PROCEDURE — 36415 COLL VENOUS BLD VENIPUNCTURE: CPT

## 2020-01-28 RX ORDER — IBUPROFEN 600 MG/1
600 TABLET, FILM COATED ORAL EVERY 8 HOURS
Qty: 20 | Refills: 0 | Status: DISCONTINUED | COMMUNITY
Start: 2018-12-13 | End: 2020-01-28

## 2020-01-28 RX ORDER — OXYCODONE AND ACETAMINOPHEN 5; 325 MG/1; MG/1
5-325 TABLET ORAL
Qty: 20 | Refills: 0 | Status: DISCONTINUED | COMMUNITY
Start: 2018-12-31 | End: 2020-01-28

## 2020-01-28 RX ORDER — OXYCODONE AND ACETAMINOPHEN 5; 325 MG/1; MG/1
5-325 TABLET ORAL
Qty: 20 | Refills: 0 | Status: DISCONTINUED | COMMUNITY
Start: 2018-12-18 | End: 2020-01-28

## 2020-01-28 RX ORDER — OXYCODONE AND ACETAMINOPHEN 5; 325 MG/1; MG/1
5-325 TABLET ORAL
Qty: 15 | Refills: 0 | Status: DISCONTINUED | COMMUNITY
Start: 2018-12-13 | End: 2020-01-28

## 2020-01-28 NOTE — HISTORY OF PRESENT ILLNESS
[de-identified] : 29-year-old male with good general health presents for an overdue yearly physical.\par \par Patient general health is good without any chronic medical issues including no cardiopulmonary, hepatorenal, hematological or diabetes history.\par \par Patient does have a remote history of EBV without sequelae.\par \par Generally the patient feels well without chest pain, palpitations, shortness of breath or edema.\par He does complain of fatigue and states he sleeps well. He is concerned about testosterone level. His erectile function is normal.\par \par Patient's one other complaint are GI issues with the patient fairly often has loose bowel movements with cramps prior to his bowel movement. He states has been going on for about a year.\par He has normal bowel movements except about 2 times a week he'll have loose bowel movements. No blood, abdominal pain other than cramps or fever. No weight loss.\par He hasn't tried to think of what foods or situations might do it.\par \par The patient is single but has a girlfriend and still lives at home with his parents. 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\par \par The patient did have a mishap December 2018 while working as a  when he fell to the ground sustaining a fracture of both wrists and knee surgery on both which was done January 2019. He was out of work for 3 months and is back at work without issue.

## 2020-01-28 NOTE — PHYSICAL EXAM
[No Acute Distress] : no acute distress [Well Developed] : well developed [Well Nourished] : well nourished [Well-Appearing] : well-appearing [Normal Sclera/Conjunctiva] : normal sclera/conjunctiva [PERRL] : pupils equal round and reactive to light [EOMI] : extraocular movements intact [Normal Oropharynx] : the oropharynx was normal [No JVD] : no jugular venous distention [Normal Outer Ear/Nose] : the outer ears and nose were normal in appearance [Supple] : supple [No Lymphadenopathy] : no lymphadenopathy [No Accessory Muscle Use] : no accessory muscle use [No Respiratory Distress] : no respiratory distress  [Thyroid Normal, No Nodules] : the thyroid was normal and there were no nodules present [Normal Rate] : normal rate  [Clear to Auscultation] : lungs were clear to auscultation bilaterally [Regular Rhythm] : with a regular rhythm [Normal S1, S2] : normal S1 and S2 [No Murmur] : no murmur heard [No Carotid Bruits] : no carotid bruits [No Abdominal Bruit] : a ~M bruit was not heard ~T in the abdomen [Pedal Pulses Present] : the pedal pulses are present [No Varicosities] : no varicosities [No Palpable Aorta] : no palpable aorta [No Edema] : there was no peripheral edema [Soft] : abdomen soft [No Extremity Clubbing/Cyanosis] : no extremity clubbing/cyanosis [Non Tender] : non-tender [No Masses] : no abdominal mass palpated [Non-distended] : non-distended [No HSM] : no HSM [Normal Posterior Cervical Nodes] : no posterior cervical lymphadenopathy [Normal Bowel Sounds] : normal bowel sounds [No CVA Tenderness] : no CVA  tenderness [Normal Anterior Cervical Nodes] : no anterior cervical lymphadenopathy [No Spinal Tenderness] : no spinal tenderness [No Joint Swelling] : no joint swelling [Grossly Normal Strength/Tone] : grossly normal strength/tone [Coordination Grossly Intact] : coordination grossly intact [No Rash] : no rash [No Focal Deficits] : no focal deficits [Normal Gait] : normal gait [Deep Tendon Reflexes (DTR)] : deep tendon reflexes were 2+ and symmetric [Normal Insight/Judgement] : insight and judgment were intact [Normal Affect] : the affect was normal [Scrotum] : the scrotum was normal [Rectal Exam - Seminal Vesicles] : the seminal vesicles were normal [Testes Tenderness] : no tenderness of the testes [Epididymis] : the epididymides were normal [Speech Grossly Normal] : speech grossly normal [Testes Mass (___cm)] : there were no testicular masses [Normal Mood] : the mood was normal [de-identified] : Tattoo left upper [de-identified] : obese

## 2020-01-28 NOTE — REVIEW OF SYSTEMS
[Negative] : Heme/Lymph [Fatigue] : fatigue [Diarrhea] : diarrhea [Nausea] : no nausea [Abdominal Pain] : no abdominal pain [Constipation] : no constipation [Vomiting] : no vomiting [Melena] : no melena [Heartburn] : no heartburn

## 2020-01-28 NOTE — HEALTH RISK ASSESSMENT
[Good] : ~his/her~ current health as good [Very Good] : ~his/her~  mood as very good [2 - 4 times a month (2 pts)] : 2-4 times a month (2 points) [Yes] : Yes [1 or 2 (0 pts)] : 1 or 2 (0 points) [Never (0 pts)] : Never (0 points) [No] : In the past 12 months have you used drugs other than those required for medical reasons? No [No falls in past year] : Patient reported no falls in the past year [0] : 2) Feeling down, depressed, or hopeless: Not at all (0) [None] : None [With Family] : lives with family [Employed] : employed [Single] : single [Carbon Monoxide Detector] : carbon monoxide detector [Smoke Detector] : smoke detector [Sunscreen] : uses sunscreen [Reviewed no changes] : Reviewed no changes [College] : College [Sexually Active] : sexually active [Fully functional (bathing, dressing, toileting, transferring, walking, feeding)] : Fully functional (bathing, dressing, toileting, transferring, walking, feeding) [Fully functional (using the telephone, shopping, preparing meals, housekeeping, doing laundry, using] : Fully functional and needs no help or supervision to perform IADLs (using the telephone, shopping, preparing meals, housekeeping, doing laundry, using transportation, managing medications and managing finances) [] : No [Audit-CScore] : 2 [de-identified] : fair [de-identified] : no exercise [YMA3Ophtx] : 0 [Reports changes in hearing] : Reports no changes in hearing [High Risk Behavior] : no high risk behavior [Change in mental status noted] : No change in mental status noted [Reports changes in dental health] : Reports no changes in dental health [Reports changes in vision] : Reports no changes in vision [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

## 2020-01-28 NOTE — ASSESSMENT
[FreeTextEntry1] : 29-year-old male with good general health who needs lifestyle changes with better diet exercise and weight loss.\par \par Patient with fatigue therefore metabolic blood work will be done including testosterone level.\par \par Patient with GI symptoms compatible with IBS with diarrhea therefore recommend keeping a food diary ofthings that lead to diarrhea and started avoidance. Also recommend doing a fiber supplement daily.\par Patient to call if symptoms persist.\par \par Patient does not use seatbelts therefore recommend patient using a seatbelt every time he drives.\par \par Influenza vaccine given left deltoid\par Tdap given right deltoid\par \par Venipuncture done today in the office\par \par Followup yearly and as needed

## 2020-01-30 ENCOUNTER — RESULT REVIEW (OUTPATIENT)
Age: 30
End: 2020-01-30

## 2020-01-30 LAB
ALBUMIN SERPL ELPH-MCNC: 4.8 G/DL
ALP BLD-CCNC: 48 U/L
ALT SERPL-CCNC: 17 U/L
ANION GAP SERPL CALC-SCNC: 11 MMOL/L
APPEARANCE: CLEAR
AST SERPL-CCNC: 15 U/L
BACTERIA: NEGATIVE
BASOPHILS # BLD AUTO: 0.04 K/UL
BASOPHILS NFR BLD AUTO: 0.6 %
BILIRUB SERPL-MCNC: 0.4 MG/DL
BILIRUBIN URINE: NEGATIVE
BLOOD URINE: NORMAL
BUN SERPL-MCNC: 13 MG/DL
CALCIUM SERPL-MCNC: 10 MG/DL
CHLORIDE SERPL-SCNC: 105 MMOL/L
CHOLEST SERPL-MCNC: 198 MG/DL
CHOLEST/HDLC SERPL: 3.6 RATIO
CO2 SERPL-SCNC: 26 MMOL/L
COLOR: YELLOW
CREAT SERPL-MCNC: 1.01 MG/DL
EOSINOPHIL # BLD AUTO: 0.12 K/UL
EOSINOPHIL NFR BLD AUTO: 1.7 %
ESTIMATED AVERAGE GLUCOSE: 114 MG/DL
GLUCOSE QUALITATIVE U: NEGATIVE
GLUCOSE SERPL-MCNC: 101 MG/DL
HBA1C MFR BLD HPLC: 5.6 %
HCT VFR BLD CALC: 44.4 %
HCV AB SER QL: NONREACTIVE
HCV S/CO RATIO: 0.55 S/CO
HDLC SERPL-MCNC: 56 MG/DL
HGB BLD-MCNC: 14.2 G/DL
HYALINE CASTS: 0 /LPF
IMM GRANULOCYTES NFR BLD AUTO: 0.1 %
KETONES URINE: NEGATIVE
LDLC SERPL CALC-MCNC: 128 MG/DL
LEUKOCYTE ESTERASE URINE: NEGATIVE
LYMPHOCYTES # BLD AUTO: 2.58 K/UL
LYMPHOCYTES NFR BLD AUTO: 35.9 %
MAN DIFF?: NORMAL
MCHC RBC-ENTMCNC: 29 PG
MCHC RBC-ENTMCNC: 32 GM/DL
MCV RBC AUTO: 90.6 FL
MICROSCOPIC-UA: NORMAL
MONOCYTES # BLD AUTO: 0.8 K/UL
MONOCYTES NFR BLD AUTO: 11.1 %
NEUTROPHILS # BLD AUTO: 3.63 K/UL
NEUTROPHILS NFR BLD AUTO: 50.6 %
NITRITE URINE: NEGATIVE
PH URINE: 5.5
PLATELET # BLD AUTO: 246 K/UL
POTASSIUM SERPL-SCNC: 4.4 MMOL/L
PROT SERPL-MCNC: 7.3 G/DL
PROTEIN URINE: NEGATIVE
RBC # BLD: 4.9 M/UL
RBC # FLD: 12.9 %
RED BLOOD CELLS URINE: 1 /HPF
SODIUM SERPL-SCNC: 141 MMOL/L
SPECIFIC GRAVITY URINE: 1.03
SQUAMOUS EPITHELIAL CELLS: 1 /HPF
TESTOST SERPL-MCNC: 278 NG/DL
TRIGL SERPL-MCNC: 72 MG/DL
TSH SERPL-ACNC: 2.08 UIU/ML
UROBILINOGEN URINE: NORMAL
WBC # FLD AUTO: 7.18 K/UL
WHITE BLOOD CELLS URINE: 1 /HPF

## 2020-03-01 LAB
PROLACTIN SERPL-MCNC: 13.4 NG/ML
TESTOST SERPL-MCNC: 307 NG/DL

## 2020-03-02 ENCOUNTER — TRANSCRIPTION ENCOUNTER (OUTPATIENT)
Age: 30
End: 2020-03-02

## 2020-03-31 ENCOUNTER — APPOINTMENT (OUTPATIENT)
Dept: ORTHOPEDIC SURGERY | Facility: CLINIC | Age: 30
End: 2020-03-31

## 2021-04-21 ENCOUNTER — APPOINTMENT (OUTPATIENT)
Dept: INTERNAL MEDICINE | Facility: CLINIC | Age: 31
End: 2021-04-21
Payer: COMMERCIAL

## 2021-04-21 ENCOUNTER — NON-APPOINTMENT (OUTPATIENT)
Age: 31
End: 2021-04-21

## 2021-04-21 VITALS
WEIGHT: 238 LBS | HEART RATE: 81 BPM | TEMPERATURE: 97.7 F | RESPIRATION RATE: 13 BRPM | DIASTOLIC BLOOD PRESSURE: 80 MMHG | BODY MASS INDEX: 34.07 KG/M2 | HEIGHT: 70 IN | SYSTOLIC BLOOD PRESSURE: 125 MMHG

## 2021-04-21 DIAGNOSIS — Z23 ENCOUNTER FOR IMMUNIZATION: ICD-10-CM

## 2021-04-21 DIAGNOSIS — Z92.29 PERSONAL HISTORY OF OTHER DRUG THERAPY: ICD-10-CM

## 2021-04-21 DIAGNOSIS — Z20.822 CONTACT WITH AND (SUSPECTED) EXPOSURE TO COVID-19: ICD-10-CM

## 2021-04-21 DIAGNOSIS — Z11.59 ENCOUNTER FOR SCREENING FOR OTHER VIRAL DISEASES: ICD-10-CM

## 2021-04-21 PROCEDURE — G0444 DEPRESSION SCREEN ANNUAL: CPT

## 2021-04-21 PROCEDURE — 99395 PREV VISIT EST AGE 18-39: CPT | Mod: 25

## 2021-04-21 PROCEDURE — 99072 ADDL SUPL MATRL&STAF TM PHE: CPT

## 2021-04-21 PROCEDURE — 36415 COLL VENOUS BLD VENIPUNCTURE: CPT

## 2021-04-21 PROCEDURE — 93000 ELECTROCARDIOGRAM COMPLETE: CPT | Mod: 59

## 2021-04-21 PROCEDURE — G0442 ANNUAL ALCOHOL SCREEN 15 MIN: CPT

## 2021-04-21 NOTE — PHYSICAL EXAM
[Well Nourished] : well nourished [Well Developed] : well developed [Well-Appearing] : well-appearing [Normal Sclera/Conjunctiva] : normal sclera/conjunctiva [PERRL] : pupils equal round and reactive to light [EOMI] : extraocular movements intact [Normal Outer Ear/Nose] : the outer ears and nose were normal in appearance [Normal Oropharynx] : the oropharynx was normal [No JVD] : no jugular venous distention [No Lymphadenopathy] : no lymphadenopathy [Supple] : supple [Thyroid Normal, No Nodules] : the thyroid was normal and there were no nodules present [No Respiratory Distress] : no respiratory distress  [No Accessory Muscle Use] : no accessory muscle use [Clear to Auscultation] : lungs were clear to auscultation bilaterally [Normal Rate] : normal rate  [Regular Rhythm] : with a regular rhythm [Normal S1, S2] : normal S1 and S2 [No Murmur] : no murmur heard [No Carotid Bruits] : no carotid bruits [No Abdominal Bruit] : a ~M bruit was not heard ~T in the abdomen [No Varicosities] : no varicosities [Pedal Pulses Present] : the pedal pulses are present [No Edema] : there was no peripheral edema [No Palpable Aorta] : no palpable aorta [No Extremity Clubbing/Cyanosis] : no extremity clubbing/cyanosis [Soft] : abdomen soft [Non Tender] : non-tender [Non-distended] : non-distended [No Masses] : no abdominal mass palpated [No HSM] : no HSM [Normal Bowel Sounds] : normal bowel sounds [Scrotum] : the scrotum was normal [Rectal Exam - Seminal Vesicles] : the seminal vesicles were normal [Epididymis] : the epididymides were normal [Testes Tenderness] : no tenderness of the testes [Testes Mass (___cm)] : there were no testicular masses [Normal Posterior Cervical Nodes] : no posterior cervical lymphadenopathy [Normal Anterior Cervical Nodes] : no anterior cervical lymphadenopathy [No CVA Tenderness] : no CVA  tenderness [No Spinal Tenderness] : no spinal tenderness [No Joint Swelling] : no joint swelling [Grossly Normal Strength/Tone] : grossly normal strength/tone [No Rash] : no rash [Coordination Grossly Intact] : coordination grossly intact [No Focal Deficits] : no focal deficits [Normal Gait] : normal gait [Deep Tendon Reflexes (DTR)] : deep tendon reflexes were 2+ and symmetric [Speech Grossly Normal] : speech grossly normal [Normal Affect] : the affect was normal [Normal Mood] : the mood was normal [Normal Insight/Judgement] : insight and judgment were intact [de-identified] : obese [de-identified] : Tattoo left upper

## 2021-04-21 NOTE — HISTORY OF PRESENT ILLNESS
[de-identified] : 30-year-old male with good general health presents for his yearly physical.\par \par Patient general health is good without any chronic medical issues including no cardiopulmonary, hepatorenal, hematological or diabetes history.\par He had made some good lifestyle changes without 20 pounds of weight loss unfortunately has gained it back\par \par Patient does have a remote history of EBV without sequelae.\par \par Generally the patient feels well without chest pain, palpitations, shortness of breath or edema.\par He does complain of fatigue and states he sleeps well. Testosterone level was checked last year which is normal.\par The patient states that he sleeps well and his girlfriend said he snores but has not noted any apneic periods.The patient occasionally dreams but not vivid\par \par at patient's last physical he complained of GI symptoms which I felt was secondary to IBS.\par Recommended food diary and noting culprit foods which the patient has done and his symptoms are much improved her\par \par The patient is single but has a girlfriend and still lives at home with his parents. 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 He was out of work for 3 months
no

## 2021-04-21 NOTE — COUNSELING
[Potential consequences of obesity discussed] : Potential consequences of obesity discussed [Benefits of weight loss discussed] : Benefits of weight loss discussed [Encouraged to increase physical activity] : Encouraged to increase physical activity [None] : None [Needs reinforcement, provided] : Patient needs reinforcement on understanding of lifestyle changes and steps needed to achieve self management goal; reinforcement was provided

## 2021-04-21 NOTE — ASSESSMENT
[FreeTextEntry1] : 30-year-old male with good general health who needs lifestyle changes with better diet exercise and weight loss.\par \par Patient fatigued possibly with no significant cause other than needing lifestyle changes with weight loss.\par last year testosterone and TSH checked which were normal.\par Patient told to lateral for a node watch him closely to see if he has any apneic periods and if so to call me and we'll schedule sleep study.\par \par iBS with GI symptoms improved with dietary changes\par \par Patient does not use seat belts therefore recommend patient using a seatbelt every time he drives.\par \par Influenza vaccine already received\par COVID vaccine not yet but will get\par Tdap UTD\par \par Venipuncture done today in the office\par \par Followup yearly and as needed

## 2021-04-21 NOTE — REVIEW OF SYSTEMS
[Fatigue] : fatigue [Diarrhea] : diarrhea [Negative] : Heme/Lymph [Abdominal Pain] : no abdominal pain [Nausea] : no nausea [Constipation] : no constipation [Vomiting] : no vomiting [Heartburn] : no heartburn [Melena] : no melena

## 2021-04-21 NOTE — HEALTH RISK ASSESSMENT
[Yes] : Yes [2 - 4 times a month (2 pts)] : 2-4 times a month (2 points) [1 or 2 (0 pts)] : 1 or 2 (0 points) [Never (0 pts)] : Never (0 points) [No] : In the past 12 months have you used drugs other than those required for medical reasons? No [No falls in past year] : Patient reported no falls in the past year [0] : 2) Feeling down, depressed, or hopeless: Not at all (0) [None] : None [With Family] : lives with family [Employed] : employed [College] : College [Single] : single [Sexually Active] : sexually active [Fully functional (bathing, dressing, toileting, transferring, walking, feeding)] : Fully functional (bathing, dressing, toileting, transferring, walking, feeding) [Fully functional (using the telephone, shopping, preparing meals, housekeeping, doing laundry, using] : Fully functional and needs no help or supervision to perform IADLs (using the telephone, shopping, preparing meals, housekeeping, doing laundry, using transportation, managing medications and managing finances) [Smoke Detector] : smoke detector [Carbon Monoxide Detector] : carbon monoxide detector [Sunscreen] : uses sunscreen [Reviewed no changes] : Reviewed no changes [Good] : ~his/her~  mood as  good [Designated Healthcare Proxy] : Designated healthcare proxy [Name: ___] : Health Care Proxy's Name: [unfilled]  [] : No [Audit-CScore] : 2 [de-identified] : no exercise [de-identified] : fair [[id number]] : 0 [Change in mental status noted] : No change in mental status noted [High Risk Behavior] : no high risk behavior [Reports changes in hearing] : Reports no changes in hearing [Reports changes in vision] : Reports no changes in vision [Reports changes in dental health] : Reports no changes in dental health [Seat Belt] : does not use seat belt [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

## 2021-04-22 LAB
ALBUMIN SERPL ELPH-MCNC: 4.6 G/DL
ALP BLD-CCNC: 57 U/L
ALT SERPL-CCNC: 19 U/L
ANION GAP SERPL CALC-SCNC: 12 MMOL/L
APPEARANCE: CLEAR
AST SERPL-CCNC: 18 U/L
BACTERIA: NEGATIVE
BASOPHILS # BLD AUTO: 0.04 K/UL
BASOPHILS NFR BLD AUTO: 0.5 %
BILIRUB SERPL-MCNC: 0.3 MG/DL
BILIRUBIN URINE: NEGATIVE
BLOOD URINE: NEGATIVE
BUN SERPL-MCNC: 11 MG/DL
CALCIUM SERPL-MCNC: 9.8 MG/DL
CHLORIDE SERPL-SCNC: 102 MMOL/L
CHOLEST SERPL-MCNC: 199 MG/DL
CO2 SERPL-SCNC: 24 MMOL/L
COLOR: NORMAL
COVID-19 NUCLEOCAPSID  GAM ANTIBODY INTERPRETATION: NEGATIVE
CREAT SERPL-MCNC: 0.93 MG/DL
EOSINOPHIL # BLD AUTO: 0.12 K/UL
EOSINOPHIL NFR BLD AUTO: 1.5 %
ESTIMATED AVERAGE GLUCOSE: 114 MG/DL
GLUCOSE QUALITATIVE U: NEGATIVE
GLUCOSE SERPL-MCNC: 91 MG/DL
HBA1C MFR BLD HPLC: 5.6 %
HCT VFR BLD CALC: 45.2 %
HDLC SERPL-MCNC: 67 MG/DL
HGB BLD-MCNC: 14.7 G/DL
HYALINE CASTS: 0 /LPF
IMM GRANULOCYTES NFR BLD AUTO: 0.2 %
KETONES URINE: NEGATIVE
LDLC SERPL CALC-MCNC: 121 MG/DL
LEUKOCYTE ESTERASE URINE: NEGATIVE
LYMPHOCYTES # BLD AUTO: 3.77 K/UL
LYMPHOCYTES NFR BLD AUTO: 45.9 %
MAN DIFF?: NORMAL
MCHC RBC-ENTMCNC: 28.8 PG
MCHC RBC-ENTMCNC: 32.5 GM/DL
MCV RBC AUTO: 88.5 FL
MICROSCOPIC-UA: NORMAL
MONOCYTES # BLD AUTO: 0.78 K/UL
MONOCYTES NFR BLD AUTO: 9.5 %
NEUTROPHILS # BLD AUTO: 3.49 K/UL
NEUTROPHILS NFR BLD AUTO: 42.4 %
NITRITE URINE: NEGATIVE
NONHDLC SERPL-MCNC: 132 MG/DL
PH URINE: 5.5
PLATELET # BLD AUTO: 253 K/UL
POTASSIUM SERPL-SCNC: 4.4 MMOL/L
PROT SERPL-MCNC: 7.5 G/DL
PROTEIN URINE: NEGATIVE
RBC # BLD: 5.11 M/UL
RBC # FLD: 12.5 %
RED BLOOD CELLS URINE: 0 /HPF
SARS-COV-2 AB SERPL QL IA: 0.1 INDEX
SODIUM SERPL-SCNC: 138 MMOL/L
SPECIFIC GRAVITY URINE: 1.02
SQUAMOUS EPITHELIAL CELLS: 0 /HPF
TRIGL SERPL-MCNC: 53 MG/DL
UROBILINOGEN URINE: NORMAL
WBC # FLD AUTO: 8.22 K/UL
WHITE BLOOD CELLS URINE: 0 /HPF

## 2021-05-24 ENCOUNTER — APPOINTMENT (OUTPATIENT)
Dept: INTERNAL MEDICINE | Facility: CLINIC | Age: 31
End: 2021-05-24
Payer: COMMERCIAL

## 2021-05-24 VITALS
WEIGHT: 234 LBS | HEART RATE: 78 BPM | SYSTOLIC BLOOD PRESSURE: 124 MMHG | RESPIRATION RATE: 12 BRPM | TEMPERATURE: 97.7 F | DIASTOLIC BLOOD PRESSURE: 80 MMHG | BODY MASS INDEX: 33.58 KG/M2

## 2021-05-24 VITALS
RESPIRATION RATE: 12 BRPM | BODY MASS INDEX: 33.5 KG/M2 | WEIGHT: 234 LBS | TEMPERATURE: 97.5 F | HEART RATE: 78 BPM | HEIGHT: 70 IN

## 2021-05-24 DIAGNOSIS — D72.820 LYMPHOCYTOSIS (SYMPTOMATIC): ICD-10-CM

## 2021-05-24 PROCEDURE — 99213 OFFICE O/P EST LOW 20 MIN: CPT | Mod: 25

## 2021-05-24 PROCEDURE — 36415 COLL VENOUS BLD VENIPUNCTURE: CPT

## 2021-05-24 PROCEDURE — 99072 ADDL SUPL MATRL&STAF TM PHE: CPT

## 2021-05-24 RX ORDER — IBUPROFEN 800 MG/1
800 TABLET, FILM COATED ORAL 3 TIMES DAILY
Qty: 90 | Refills: 1 | Status: DISCONTINUED | COMMUNITY
Start: 2018-12-21 | End: 2021-05-24

## 2021-05-24 NOTE — PHYSICAL EXAM
[No Acute Distress] : no acute distress [No Respiratory Distress] : no respiratory distress  [Clear to Auscultation] : lungs were clear to auscultation bilaterally [Normal Rate] : normal rate  [Regular Rhythm] : with a regular rhythm [Normal Affect] : the affect was normal [Normal Mood] : the mood was normal [No Lymphadenopathy] : no lymphadenopathy

## 2021-05-25 ENCOUNTER — NON-APPOINTMENT (OUTPATIENT)
Age: 31
End: 2021-05-25

## 2021-05-25 ENCOUNTER — TRANSCRIPTION ENCOUNTER (OUTPATIENT)
Age: 31
End: 2021-05-25

## 2021-05-25 LAB
BASOPHILS # BLD AUTO: 0.04 K/UL
BASOPHILS NFR BLD AUTO: 0.6 %
EOSINOPHIL # BLD AUTO: 0.13 K/UL
EOSINOPHIL NFR BLD AUTO: 1.9 %
HCT VFR BLD CALC: 45.7 %
HGB BLD-MCNC: 14.6 G/DL
IMM GRANULOCYTES NFR BLD AUTO: 0.1 %
LYMPHOCYTES # BLD AUTO: 2.57 K/UL
LYMPHOCYTES NFR BLD AUTO: 38.3 %
MAN DIFF?: NORMAL
MCHC RBC-ENTMCNC: 28.7 PG
MCHC RBC-ENTMCNC: 31.9 GM/DL
MCV RBC AUTO: 90 FL
MONOCYTES # BLD AUTO: 0.61 K/UL
MONOCYTES NFR BLD AUTO: 9.1 %
NEUTROPHILS # BLD AUTO: 3.35 K/UL
NEUTROPHILS NFR BLD AUTO: 50 %
PLATELET # BLD AUTO: 277 K/UL
RBC # BLD: 5.08 M/UL
RBC # FLD: 12.7 %
WBC # FLD AUTO: 6.71 K/UL

## 2021-05-25 NOTE — ASSESSMENT
[FreeTextEntry1] : Venipuncture done in our office, Labs sent out. Patient advised to continue present medications with diet/exercise and specialists followup. Patient  will return to the office prn/Qyear\par \par \par Vaccines are UTD, +got covid vaccines\par MD's\par 1.Dentist +\par

## 2021-05-25 NOTE — HISTORY OF PRESENT ILLNESS
[FreeTextEntry1] : followup [de-identified] : Pt presents for followup/repeat labs\par -see last labs as lymphocytes were elevated at 45.9\par -abnormal results d/w pt and questions answered\par -continues on no meds\par -got covid vaccine #1 yesterday\par -feels well w/o complaints

## 2021-07-01 ENCOUNTER — APPOINTMENT (OUTPATIENT)
Dept: INTERNAL MEDICINE | Facility: CLINIC | Age: 31
End: 2021-07-01
Payer: COMMERCIAL

## 2021-07-01 VITALS
SYSTOLIC BLOOD PRESSURE: 120 MMHG | DIASTOLIC BLOOD PRESSURE: 80 MMHG | HEIGHT: 70 IN | HEART RATE: 92 BPM | TEMPERATURE: 97 F | OXYGEN SATURATION: 98 % | RESPIRATION RATE: 11 BRPM | WEIGHT: 230 LBS | BODY MASS INDEX: 32.93 KG/M2

## 2021-07-01 DIAGNOSIS — Z23 ENCOUNTER FOR IMMUNIZATION: ICD-10-CM

## 2021-07-01 DIAGNOSIS — Z02.89 ENCOUNTER FOR OTHER ADMINISTRATIVE EXAMINATIONS: ICD-10-CM

## 2021-07-01 PROCEDURE — 99072 ADDL SUPL MATRL&STAF TM PHE: CPT

## 2021-07-01 PROCEDURE — 99213 OFFICE O/P EST LOW 20 MIN: CPT

## 2021-07-01 NOTE — ASSESSMENT
[FreeTextEntry1] : Form completed and given. Patient will return to the office for CPE when applicable\par \par Dr. Andrea was present in office building while I examined patient\par

## 2021-07-01 NOTE — PHYSICAL EXAM
[No Acute Distress] : no acute distress [Clear to Auscultation] : lungs were clear to auscultation bilaterally [No Respiratory Distress] : no respiratory distress  [Normal Rate] : normal rate  [Regular Rhythm] : with a regular rhythm [Normal Affect] : the affect was normal [Normal Mood] : the mood was normal

## 2021-07-01 NOTE — HISTORY OF PRESENT ILLNESS
[FreeTextEntry8] : Patient presents for work form, feels well without complaints\par -Continues on no daily medication

## 2021-07-08 ENCOUNTER — APPOINTMENT (OUTPATIENT)
Dept: INTERNAL MEDICINE | Facility: CLINIC | Age: 31
End: 2021-07-08
Payer: COMMERCIAL

## 2021-07-08 VITALS
BODY MASS INDEX: 33 KG/M2 | SYSTOLIC BLOOD PRESSURE: 120 MMHG | TEMPERATURE: 97.1 F | RESPIRATION RATE: 12 BRPM | DIASTOLIC BLOOD PRESSURE: 80 MMHG | HEART RATE: 91 BPM | WEIGHT: 230 LBS

## 2021-07-08 VITALS
HEART RATE: 91 BPM | OXYGEN SATURATION: 98 % | TEMPERATURE: 97.1 F | BODY MASS INDEX: 32.93 KG/M2 | WEIGHT: 230 LBS | HEIGHT: 70 IN

## 2021-07-08 PROCEDURE — 36415 COLL VENOUS BLD VENIPUNCTURE: CPT

## 2021-07-08 PROCEDURE — 99214 OFFICE O/P EST MOD 30 MIN: CPT | Mod: 25

## 2021-07-08 PROCEDURE — 99072 ADDL SUPL MATRL&STAF TM PHE: CPT

## 2021-07-13 LAB
BASOPHILS # BLD AUTO: 0.03 K/UL
BASOPHILS NFR BLD AUTO: 0.4 %
EOSINOPHIL # BLD AUTO: 0.11 K/UL
EOSINOPHIL NFR BLD AUTO: 1.3 %
HCT VFR BLD CALC: 45.2 %
HGB BLD-MCNC: 14.5 G/DL
IMM GRANULOCYTES NFR BLD AUTO: 0.2 %
LYMPHOCYTES # BLD AUTO: 2.62 K/UL
LYMPHOCYTES NFR BLD AUTO: 31.8 %
MAN DIFF?: NORMAL
MCHC RBC-ENTMCNC: 29.4 PG
MCHC RBC-ENTMCNC: 32.1 GM/DL
MCV RBC AUTO: 91.5 FL
MONOCYTES # BLD AUTO: 0.73 K/UL
MONOCYTES NFR BLD AUTO: 8.8 %
NEUTROPHILS # BLD AUTO: 4.74 K/UL
NEUTROPHILS NFR BLD AUTO: 57.5 %
PLATELET # BLD AUTO: 276 K/UL
RBC # BLD: 4.94 M/UL
RBC # FLD: 12.6 %
WBC # FLD AUTO: 8.25 K/UL

## 2021-07-15 ENCOUNTER — NON-APPOINTMENT (OUTPATIENT)
Age: 31
End: 2021-07-15

## 2021-07-21 ENCOUNTER — APPOINTMENT (OUTPATIENT)
Dept: ULTRASOUND IMAGING | Facility: CLINIC | Age: 31
End: 2021-07-21
Payer: COMMERCIAL

## 2021-07-21 ENCOUNTER — OUTPATIENT (OUTPATIENT)
Dept: OUTPATIENT SERVICES | Facility: HOSPITAL | Age: 31
LOS: 1 days | End: 2021-07-21

## 2021-07-21 DIAGNOSIS — R59.0 LOCALIZED ENLARGED LYMPH NODES: ICD-10-CM

## 2021-07-21 PROCEDURE — 76856 US EXAM PELVIC COMPLETE: CPT | Mod: 26

## 2021-07-21 PROCEDURE — 76700 US EXAM ABDOM COMPLETE: CPT | Mod: 26

## 2021-07-25 NOTE — PHYSICAL EXAM
[No Acute Distress] : no acute distress [No Lymphadenopathy] : no lymphadenopathy [No Respiratory Distress] : no respiratory distress  [Clear to Auscultation] : lungs were clear to auscultation bilaterally [Normal Rate] : normal rate  [Regular Rhythm] : with a regular rhythm [Soft] : abdomen soft [Non Tender] : non-tender [Normal Supraclavicular Nodes] : no supraclavicular lymphadenopathy [Normal Axillary Nodes] : no axillary lymphadenopathy [No Rash] : no rash [Normal Affect] : the affect was normal [Normal Mood] : the mood was normal [de-identified] : +Pea-sized soft and mobile lymph node left inguinal

## 2021-07-25 NOTE — HISTORY OF PRESENT ILLNESS
[FreeTextEntry8] : Patient presents complaining of lump to left groin that he noticed a few days ago. Patient is asymptomatic without pain. Patient denies abdominal pain/nausea/vomiting/weight loss/night sweats/fever/Urinary symptom/penile discharge/rash

## 2021-07-25 NOTE — ASSESSMENT
[FreeTextEntry1] : Venipuncture done in our office, Labs sent out.Abdominal/pelvic sonogram ordered to efren . Discussed antibiotics which we will consider. Further recommendations to follow. Patient will return to office for CPE when applicable\par \par \par \par **CBC/FC sent out\par **abd/Pelvic sono ordered\par (not appearing in note)\par \par Dr. Magana was present in office building while I examined patient\par

## 2021-07-26 ENCOUNTER — NON-APPOINTMENT (OUTPATIENT)
Age: 31
End: 2021-07-26

## 2021-07-27 ENCOUNTER — RESULT REVIEW (OUTPATIENT)
Age: 31
End: 2021-07-27

## 2021-08-04 ENCOUNTER — APPOINTMENT (OUTPATIENT)
Dept: MRI IMAGING | Facility: CLINIC | Age: 31
End: 2021-08-04
Payer: COMMERCIAL

## 2021-08-04 ENCOUNTER — OUTPATIENT (OUTPATIENT)
Dept: OUTPATIENT SERVICES | Facility: HOSPITAL | Age: 31
LOS: 1 days | End: 2021-08-04

## 2021-08-04 DIAGNOSIS — R59.0 LOCALIZED ENLARGED LYMPH NODES: ICD-10-CM

## 2021-08-04 PROCEDURE — 72196 MRI PELVIS W/DYE: CPT | Mod: 26

## 2021-08-09 ENCOUNTER — NON-APPOINTMENT (OUTPATIENT)
Age: 31
End: 2021-08-09

## 2021-08-11 ENCOUNTER — NON-APPOINTMENT (OUTPATIENT)
Age: 31
End: 2021-08-11

## 2021-08-19 ENCOUNTER — APPOINTMENT (OUTPATIENT)
Dept: ORTHOPEDIC SURGERY | Facility: CLINIC | Age: 31
End: 2021-08-19
Payer: COMMERCIAL

## 2021-08-19 ENCOUNTER — NON-APPOINTMENT (OUTPATIENT)
Age: 31
End: 2021-08-19

## 2021-08-19 VITALS
BODY MASS INDEX: 32.93 KG/M2 | DIASTOLIC BLOOD PRESSURE: 86 MMHG | HEIGHT: 70 IN | OXYGEN SATURATION: 98 % | WEIGHT: 230 LBS | SYSTOLIC BLOOD PRESSURE: 144 MMHG | HEART RATE: 74 BPM

## 2021-08-19 DIAGNOSIS — R59.0 LOCALIZED ENLARGED LYMPH NODES: ICD-10-CM

## 2021-08-19 PROCEDURE — 99072 ADDL SUPL MATRL&STAF TM PHE: CPT

## 2021-08-19 PROCEDURE — 99244 OFF/OP CNSLTJ NEW/EST MOD 40: CPT

## 2021-08-19 PROCEDURE — 76882 US LMTD JT/FCL EVL NVASC XTR: CPT

## 2021-09-12 PROBLEM — R59.0 INGUINAL LYMPHADENOPATHY: Status: ACTIVE | Noted: 2021-07-08

## 2021-09-12 NOTE — HISTORY OF PRESENT ILLNESS
[FreeTextEntry1] : This 30-year-old gentleman was sent by his primary medical doctor because of left inguinal mass.  This does not cause him any pain.  It has been there for approximately 2 to 3 months and he thinks that it may be getting smaller.  He did have an ultrasound as well as MRI recently for further evaluation.  He is able to move appropriately.  He does not remember any inciting problems over his leg or any cuts or injuries. [Stable] : stable [0] : currently ~his/her~ pain is 0 out of 10 [None] : No exacerbating factors are noted

## 2021-09-12 NOTE — PHYSICAL EXAM
[FreeTextEntry1] : On exam the patient stands a good balance.  He is able to move around appropriately.  He does have a small palpable mass felt in the area of concern.  There is no Tinel's thrills or bruits.  He has minimal tenderness associated with it.  He has no other masses elsewhere throughout.  He has no popliteal or contralateral lymphadenopathy.  He has full range of motion bilateral hips knees ankles and feet.  He is otherwise neurovascularly intact. [General Appearance - Well-Appearing] : Well appearing [General Appearance - Well Nourished] : well nourished [Oriented To Time, Place, And Person] : Oriented to person, place, and time [Impaired Insight] : Insight and judgment were intact [Affect] : The affect was normal. [Mood] : the mood was normal [Sclera] : the sclera and conjunctiva were normal [Neck Cervical Mass (___cm)] : no neck mass was observed [Heart Rate And Rhythm] : heart rate was normal and rhythm regular [] : No respiratory distress [Abdomen Soft] : Soft [Normal Station and Gait] : gait and station were normal [Swelling] : swelling [Masses] : masses [Tenderness] : no tenderness [Skin Changes - Describe changes:] : No skin changes noted [Full ROM Unless otherwise noted:] : Full range of motion unless otherwise noted: [LE  Motor Strength Normal unless otherwise noted:] : 5/5 strength in bilateral lower extemities unless otherwise noted. [Normal] : Sensation intact to light touch. [FreeTextEntry5] : As above

## 2021-09-12 NOTE — DATA REVIEWED
[Imaging Present] : Present [de-identified] : Ultrasound of the inguinal area on 2021-07-25 shows:\par IMPRESSION:\par Area of palpable concern in the left groin demonstrates a subcutaneous lesion measuring 1.3 cm which based on its sonographic features could represent a fatty replaced enlarged lymph node or lipoma. Recommend further evaluation with contrast-enhanced enhanced MRI of the pelvis.\par \par MRI scan from 2020-08-09 one of the pelvis shows:\par IMPRESSION:\par Nonspecific T2 hyperintense wispy edema and enhancement localized to a region of subcutaneous fat at the anterior left inguinal region. No well-encapsulated border to this lesion to suggest a lipoma however a poorly encapsulated lipoma could have this appearance with atypical internal features; benign and malignant lipomatous masses are not excluded. Consultation with orthopedic oncology may be required.\par \par No evidence of lymphadenopathy or cystic lesion.\par \par \par Focused ultrasound done here in the office shows a small mass in the area of the lymph nodes with it measuring 1.57 x 0.71 cm this is the area that he feels the mass.  There is no flow in this.

## 2021-09-12 NOTE — DISCUSSION/SUMMARY
[All Questions Answered] : Patient (and family) had all questions answered to an agreeable level of satisfaction [Interested in Proceeding] : Patient (and family) expressed understanding and interest in proceeding with the plan as outlined [de-identified] : I do not think patient has atypical lipoma rather he just has 1 lymph node.  He does not think it is getting bigger rather it is the same and it is not tender.  I would follow him up again with an ultrasound in 1 month.  Assuming there is no change I would let him be.  This does not look like anything pathological by size this is not pathologic either.  He does not seem to have any lipoma either.  Follow-up again in 1 month.\par \par If imaging was ordered, the patient was told to make an appointment to review findings right after all imaging is completed.\par \par We discussed risks, benefits and alternatives. Rationale of care was reviewed and all questions were answered. Patient (and family) had all questions answered to her degree of the level of satisfaction. Patient (and family) expressed understanding and interest in proceeding with the plan as outlined.\par \par \par \par \par This note was done with a voice recognition transcription software and any typos are related to this rather than medical error. Surgical risks reviewed. Patient (and family) had all questions answered to an agreeable level of satisfaction. Patient (and family) expressed understanding and interest in proceeding with the plan as outlined.  \par

## 2021-09-12 NOTE — CONSULT LETTER
[Dear  ___] : Dear  [unfilled], [FreeTextEntry2] : Bharath Magana MD [FreeTextEntry1] : \par After evaluating your patient and reviewing the studies presented we have come to a mutually agreeable plan. \par \par Please see my note below.\par \par Should you have further questions, please feel free to call and discuss the care of your patient.\par \par Thank you for the confidence of your referral.\par \par Sincerely, \par \par Jason Rodriguez MD \par Chief, Musculoskeletal Oncology \par [address], [address], \[place] [address] \par [id number]\par [phone]

## 2021-09-12 NOTE — REVIEW OF SYSTEMS
[Chills] : no chills [Feeling Tired] : feeling tired [Fever] : no fever [Recent Wt Gain ___ (lbs)] : no recent weight gain [Joint Swelling] : joint swelling [Nl] : Hematologic/Lymphatic

## 2021-09-30 ENCOUNTER — APPOINTMENT (OUTPATIENT)
Dept: ORTHOPEDIC SURGERY | Facility: CLINIC | Age: 31
End: 2021-09-30
Payer: COMMERCIAL

## 2021-09-30 VITALS
OXYGEN SATURATION: 98 % | HEART RATE: 79 BPM | HEIGHT: 70 IN | SYSTOLIC BLOOD PRESSURE: 137 MMHG | WEIGHT: 230 LBS | DIASTOLIC BLOOD PRESSURE: 83 MMHG | BODY MASS INDEX: 32.93 KG/M2

## 2021-09-30 DIAGNOSIS — R19.09 OTHER INTRA-ABDOMINAL AND PELVIC SWELLING, MASS AND LUMP: ICD-10-CM

## 2021-09-30 PROCEDURE — 99214 OFFICE O/P EST MOD 30 MIN: CPT

## 2021-09-30 PROCEDURE — 99072 ADDL SUPL MATRL&STAF TM PHE: CPT

## 2021-09-30 PROCEDURE — 76882 US LMTD JT/FCL EVL NVASC XTR: CPT

## 2021-10-04 NOTE — HISTORY OF PRESENT ILLNESS
[FreeTextEntry1] : Patient seen position as before.  He still has the same findings as before.  He has minimal pain but is considering taking it out. [Stable] : stable [1] : currently ~his/her~ pain is 1 out of 10 [Direct Pressure] : worsened by direct pressure

## 2021-10-04 NOTE — DATA REVIEWED
[Imaging Present] : Present [de-identified] : Focused ultrasound of the left groin showed the lesion that is approximately 1 x 1 cm.  This is slightly smaller than before but relatively unchanged.  There is no flow in it.

## 2021-10-04 NOTE — PHYSICAL EXAM
[FreeTextEntry1] : On exam patient is a small mass in his groin.  This seems to be in the area of the lymph nodes.  It is minimally mobile it is approximately 1 cm.  It is mobile subcutaneous with minimal pain.  He has no Tinel's thrills or bruits.  He is able to move otherwise appropriately. [General Appearance - Well-Appearing] : Well appearing [General Appearance - Well Nourished] : well nourished [Oriented To Time, Place, And Person] : Oriented to person, place, and time [Impaired Insight] : Insight and judgment were intact [Affect] : The affect was normal. [Mood] : the mood was normal [Sclera] : the sclera and conjunctiva were normal [Neck Cervical Mass (___cm)] : no neck mass was observed [Heart Rate And Rhythm] : heart rate was normal and rhythm regular [] : No respiratory distress [Abdomen Soft] : Soft [Normal Station and Gait] : gait and station were normal [Swelling] : swelling [Masses] : masses [Tenderness] : no tenderness [Skin Changes - Describe changes:] : No skin changes noted [Full ROM Unless otherwise noted:] : Full range of motion unless otherwise noted: [LE  Motor Strength Normal unless otherwise noted:] : 5/5 strength in bilateral lower extemities unless otherwise noted. [Normal] : Sensation intact to light touch. [FreeTextEntry5] : As above

## 2021-10-04 NOTE — DISCUSSION/SUMMARY
[Surgical risks reviewed] : Surgical risks reviewed [All Questions Answered] : Patient (and family) had all questions answered to an agreeable level of satisfaction [Interested in Proceeding] : Patient (and family) expressed understanding and interest in proceeding with the plan as outlined [de-identified] : Patient feels he is symptomatic from this and wants to have it taken out.  I told him it is likely benign however he wants it out.  We will plan for surgery for this for a primary wide resection.  He understands risk benefits and alternatives associated with this.  Schedule surgery on an outpatient basis in the near future.\par \par If imaging was ordered, the patient was told to make an appointment to review findings right after all imaging is completed.\par \par We discussed risks, benefits and alternatives. Rationale of care was reviewed and all questions were answered. Patient (and family) had all questions answered to her degree of the level of satisfaction. Patient (and family) expressed understanding and interest in proceeding with the plan as outlined.\par \par \par \par \par This note was done with a voice recognition transcription software and any typos are related to this rather than medical error. Surgical risks reviewed. Patient (and family) had all questions answered to an agreeable level of satisfaction. Patient (and family) expressed understanding and interest in proceeding with the plan as outlined.  \par

## 2021-10-05 ENCOUNTER — OUTPATIENT (OUTPATIENT)
Dept: OUTPATIENT SERVICES | Facility: HOSPITAL | Age: 31
LOS: 1 days | End: 2021-10-05
Payer: COMMERCIAL

## 2021-10-05 VITALS
OXYGEN SATURATION: 98 % | WEIGHT: 233.03 LBS | RESPIRATION RATE: 14 BRPM | DIASTOLIC BLOOD PRESSURE: 93 MMHG | SYSTOLIC BLOOD PRESSURE: 138 MMHG | TEMPERATURE: 99 F | HEIGHT: 70 IN | HEART RATE: 90 BPM

## 2021-10-05 DIAGNOSIS — R19.09 OTHER INTRA-ABDOMINAL AND PELVIC SWELLING, MASS AND LUMP: ICD-10-CM

## 2021-10-05 DIAGNOSIS — Z98.890 OTHER SPECIFIED POSTPROCEDURAL STATES: Chronic | ICD-10-CM

## 2021-10-05 DIAGNOSIS — R19.00 INTRA-ABDOMINAL AND PELVIC SWELLING, MASS AND LUMP, UNSPECIFIED SITE: ICD-10-CM

## 2021-10-05 LAB
ANION GAP SERPL CALC-SCNC: 11 MMOL/L — SIGNIFICANT CHANGE UP (ref 5–17)
BUN SERPL-MCNC: 16 MG/DL — SIGNIFICANT CHANGE UP (ref 7–23)
CALCIUM SERPL-MCNC: 9.6 MG/DL — SIGNIFICANT CHANGE UP (ref 8.4–10.5)
CHLORIDE SERPL-SCNC: 103 MMOL/L — SIGNIFICANT CHANGE UP (ref 96–108)
CO2 SERPL-SCNC: 25 MMOL/L — SIGNIFICANT CHANGE UP (ref 22–31)
CREAT SERPL-MCNC: 1.03 MG/DL — SIGNIFICANT CHANGE UP (ref 0.5–1.3)
GLUCOSE SERPL-MCNC: 128 MG/DL — HIGH (ref 70–99)
HCT VFR BLD CALC: 45.2 % — SIGNIFICANT CHANGE UP (ref 39–50)
HGB BLD-MCNC: 14.6 G/DL — SIGNIFICANT CHANGE UP (ref 13–17)
MCHC RBC-ENTMCNC: 28 PG — SIGNIFICANT CHANGE UP (ref 27–34)
MCHC RBC-ENTMCNC: 32.3 GM/DL — SIGNIFICANT CHANGE UP (ref 32–36)
MCV RBC AUTO: 86.8 FL — SIGNIFICANT CHANGE UP (ref 80–100)
NRBC # BLD: 0 /100 WBCS — SIGNIFICANT CHANGE UP (ref 0–0)
PLATELET # BLD AUTO: 289 K/UL — SIGNIFICANT CHANGE UP (ref 150–400)
POTASSIUM SERPL-MCNC: 4.2 MMOL/L — SIGNIFICANT CHANGE UP (ref 3.5–5.3)
POTASSIUM SERPL-SCNC: 4.2 MMOL/L — SIGNIFICANT CHANGE UP (ref 3.5–5.3)
RBC # BLD: 5.21 M/UL — SIGNIFICANT CHANGE UP (ref 4.2–5.8)
RBC # FLD: 12.5 % — SIGNIFICANT CHANGE UP (ref 10.3–14.5)
SODIUM SERPL-SCNC: 139 MMOL/L — SIGNIFICANT CHANGE UP (ref 135–145)
WBC # BLD: 9.07 K/UL — SIGNIFICANT CHANGE UP (ref 3.8–10.5)
WBC # FLD AUTO: 9.07 K/UL — SIGNIFICANT CHANGE UP (ref 3.8–10.5)

## 2021-10-05 PROCEDURE — 36415 COLL VENOUS BLD VENIPUNCTURE: CPT

## 2021-10-05 PROCEDURE — 80048 BASIC METABOLIC PNL TOTAL CA: CPT

## 2021-10-05 PROCEDURE — 85027 COMPLETE CBC AUTOMATED: CPT

## 2021-10-05 PROCEDURE — G0463: CPT

## 2021-10-05 RX ORDER — SODIUM CHLORIDE 9 MG/ML
3 INJECTION INTRAMUSCULAR; INTRAVENOUS; SUBCUTANEOUS EVERY 8 HOURS
Refills: 0 | Status: DISCONTINUED | OUTPATIENT
Start: 2021-10-11 | End: 2021-10-25

## 2021-10-05 NOTE — H&P PST ADULT - NSICDXFAMILYHX_GEN_ALL_CORE_FT
FAMILY HISTORY:  Grandparent  Still living? No  Diabetes mellitus, Age at diagnosis: Age Unknown

## 2021-10-05 NOTE — H&P PST ADULT - ACTIVITY
Indoor rock climbing once a month, gym once a week (weight-lifting x 1 hour), able to walk 2-3 blocks, 1 flight of stairs

## 2021-10-05 NOTE — H&P PST ADULT - NSICDXPASTSURGICALHX_GEN_ALL_CORE_FT
PAST SURGICAL HISTORY:  History of open reduction and internal fixation (ORIF) procedure B/L wrists 1/2019 after a fall at work 12/2018

## 2021-10-05 NOTE — H&P PST ADULT - ATTENDING COMMENTS
I have reviewed and agree with note as written above  for resection left groin mass  Risks, benefits and alternatives discussed with patient.  Jason Rodriguez MD  Musculoskeletal Oncology  [phone]

## 2021-10-05 NOTE — H&P PST ADULT - HISTORY OF PRESENT ILLNESS
right groin lump x 4 months  no pain     Covid vaccine Moderna 2nd dose received 6/2021  Covid PCR test scheduled 10/8/21 at [facility]  Denies Recent travel, Exposure or Covid symptoms  30 year old male with PMH Obesity (BMI 33.4) and B/L radius ORIF 1/2019 reports c/o palpating right groin lump x 4 months with no associated pain. Bedside focused ultrasound revealed a small mass in the area of lymph nodes measuring approx. 1.57 x 0.71 cm. Pt now presents to [facility] for scheduled resection of left groin mass with Dr. Rodriguez on 10/11/21.    Covid vaccine Moderna 2nd dose received 6/2021  [facility]  Denies Recent travel, Exposure or Covid symptoms  30 year old male with PMH Obesity (BMI 33.4) and B/L radius ORIF 1/2019 reports c/o palpating left groin lump x 4 months with no associated pain. Bedside focused ultrasound revealed a small mass in the area of lymph nodes measuring approx. 1.57 x 0.71 cm. Pt now presents to [facility] for scheduled resection of left groin mass with Dr. Rodriguez on 10/11/21.    Covid vaccine Moderna 2nd dose received 6/2021  Covid PCR test scheduled 10/8/21 at [facility]  Denies Recent travel, Exposure or Covid symptoms

## 2021-10-05 NOTE — H&P PST ADULT - NSICDXPASTMEDICALHX_GEN_ALL_CORE_FT
PAST MEDICAL HISTORY:  Inguinal lymphadenopathy left side    Unspecified fracture of the lower end of left radius, initial encounter for closed fracture s/p ORIF 1/2019    Unspecified fracture of the lower end of right radius, initial encounter for closed fracture s/p ORIF 1/2019     PAST MEDICAL HISTORY:  Inguinal lymphadenopathy left side    Obese BMI 33.4    Unspecified fracture of the lower end of left radius, initial encounter for closed fracture s/p ORIF 1/2019    Unspecified fracture of the lower end of right radius, initial encounter for closed fracture s/p ORIF 1/2019

## 2021-10-05 NOTE — H&P PST ADULT - LAST ECHOCARDIOGRAM
Possibly 5 years ago due to abnormal EKG - as per patient, results were normal Possibly 5 years ago due to abnormal EKG showing an enlarged heart - as per patient, results were normal

## 2021-10-05 NOTE — H&P PST ADULT - OPHTHALMOLOGIC
[facility]  Cardiology  Consult Note    Patient Name: Marck Madison  MRN: [id number]  Admission Date: 1/20/2020  Hospital Length of Stay: 1 days  Code Status: Full Code   Attending Provider: Winifred Lopez MD   Consulting Provider: Patrice Brunner MD  Primary Care Physician: Primary Doctor No  Principal Problem:Cardiac arrest    Patient information was obtained from past medical records and ER records.     Inpatient consult to Cardiology  Consult performed by: Patrice Brunner MD  Consult ordered by: Trevor Romero MD        Subjective:     Chief Complaint:  AFL c RVR, s/p code     HPI:   Marck Madison is a 59 y.o. male  who presented with shorntess of breath. Progressive in nature. EKG showed AFL c RVR. Patient was started on amiodarone. Patients hypoxemia persisted. Patient b/c hypotensive. According to notes his IV infiltrated. His respiratory status worsened. Subsequently required intubation. In transfer patient b/c bradycardic, PEA. ACLS initiated. Prior EF noted to be 30%. Echo today 35%. Moderate MR. EKG does not appear to be ischemic. LFTs elevated. Troponin abnormal.    Echo 1/21/2020:    · Concentric left ventricular hypertrophy.  · Atrial fibrillation observed.  · Moderately decreased left ventricular systolic function. The estimated ejection fraction is 35%.  · Normal right ventricular systolic function.  · Moderate mitral regurgitation.  · Moderate tricuspid regurgitation.  · No pulmonary hypertension present.     Echo 9/12/19:    · Moderately decreased left ventricular systolic function. The estimated ejection fraction is 30%  · Left ventricular diastolic dysfunction.  · Normal right ventricular systolic function.  · Moderate left atrial enlargement.  · Normal appearing left atrial appendage. No thrombus is present in the appendage.  · Moderate right atrial enlargement.  · Mild mitral regurgitation.  · Mild tricuspid regurgitation.  A synchronized cardioversion was successfully  "performed with restoration of normal sinus rhythm.    Past Medical History:   Diagnosis Date    Arrhythmia 2017    aflutter    CAD (coronary artery disease)     CHF (congestive heart failure), NYHA class I 2017    Psychiatric disorder     h/o "schizophrena/bipolar"       Past Surgical History:   Procedure Laterality Date    LIVER SURGERY      abscess drainage; 1970s    TREATMENT OF CARDIAC ARRHYTHMIA  9/12/2019    Procedure: Cardioversion or Defibrillation;  Surgeon: Jonathan Lopez MD;  Location: [facility] CATH LAB;  Service: Cardiology;;       Review of patient's allergies indicates:  No Known Allergies    No current facility-administered medications on file prior to encounter.      Current Outpatient Medications on File Prior to Encounter   Medication Sig    amiodarone (PACERONE) 200 MG Tab Take 1 tablet (200 mg total) by mouth 2 (two) times daily.    metOLazone (ZAROXOLYN) 5 MG tablet Take 1 tablet (5 mg total) by mouth once daily.     Family History     None        Tobacco Use    Smoking status: Current Every Day Smoker     Packs/day: 0.50     Years: 5.00     Pack years: 2.50     Types: Cigarettes    Smokeless tobacco: Never Used   Substance and Sexual Activity    Alcohol use: Yes     Alcohol/week: 14.0 standard drinks     Types: 14 Shots of liquor per week     Comment: "[employer]" everyday    Drug use: No    Sexual activity: Not on file     Review of Systems   Unable to perform ROS: intubated     Objective:     Vital Signs (Most Recent):  Temp: (!) 90.3 °F (32.4 °C) (01/21/20 1220)  Pulse: (!) 49 (01/21/20 1148)  Resp: (!) 22 (01/21/20 1148)  BP: (!) 150/96 (01/21/20 0500)  SpO2: 100 % (01/21/20 1148) Vital Signs (24h Range):  Temp:  [89.4 °F (31.9 °C)-97.8 °F (36.6 °C)] 90.3 °F (32.4 °C)  Pulse:  [] 49  Resp:  [14-40] 22  SpO2:  [78 %-100 %] 100 %  BP: ()/() 150/96  Arterial Line BP: ()/() 121/89     Weight: 79.5 kg (175 lb 4.3 oz)  Body mass index is 28.29 " kg/m².    SpO2: 100 %  O2 Device (Oxygen Therapy): ventilator      Intake/Output Summary (Last 24 hours) at 1/21/2020 1248  Last data filed at 1/21/2020 0607  Gross per 24 hour   Intake 370 ml   Output 3150 ml   Net -2780 ml       Lines/Drains/Airways     Central Venous Catheter Line                 Percutaneous Central Line Insertion/Assessment - triple lumen  01/20/20 2330 right femoral vein;right femoral less than 1 day          Drain                 NG/OG Tube 01/20/20 1906 [place] sump 18 Fr. Right mouth less than 1 day         Urethral Catheter 01/20/20 1925 Coude 16 Fr. less than 1 day          Airway                 Airway - Non-Surgical 01/20/20 1857 Endotracheal Tube less than 1 day          Arterial Line                 Arterial Line 01/20/20 2345 Left Radial less than 1 day          Peripheral Intravenous Line                 Peripheral IV - Single Lumen 01/20/20 1710 20 G Right Antecubital less than 1 day         Peripheral IV - Single Lumen 01/20/20 1730 20 G Left Antecubital less than 1 day                Physical Exam   Constitutional: He is sedated and intubated.   Cardiovascular: Tachycardia present.   Murmur heard.  High-pitched blowing holosystolic murmur is present with a grade of 2/6 at the apex.  Pulmonary/Chest: Breath sounds normal. He is intubated.   Abdominal: Soft.   Musculoskeletal:        Right lower leg: He exhibits no edema.        Left lower leg: He exhibits no edema.   Vitals reviewed.      Significant Labs:   CMP   Recent Labs   Lab 01/20/20  2322 01/21/20  0400 01/21/20  1000 01/21/20  1143     142 143 137 140   K 3.4*  3.4* 3.2* 4.0 4.3   *  112* 106 105 106   CO2 18*  18* 23 19* 20*   *  164*  164*  164* 146*  146* 299*  299* 273*  273*   BUN 30*  30* 30* 29* 27*   CREATININE 2.1*  2.1* 1.9* 1.9* 1.9*   CALCIUM 9.2  9.2 10.2 9.9 10.1   PROT 6.4  --  7.2 7.7   ALBUMIN 3.3*  --  3.7 3.8   BILITOT 1.4*  --  1.2* 1.3*   ALKPHOS 70  --  64 66   *   --  367* 370*   *  --  258* 269*   ANIONGAP 12  12 14 13 14   ESTGFRAFRICA 38*  38* 43* 43* 43*   EGFRNONAA 33*  33* 37* 37* 37*   , CBC   Recent Labs   Lab 01/20/20  1731  01/21/20  0400   WBC 9.23  --  13.20*   HGB 16.3  --  13.9*   HCT 49.1   < > 40.6     --  155    < > = values in this interval not displayed.   , Lipid Panel No results for input(s): CHOL, HDL, LDLCALC, TRIG, CHOLHDL in the last 48 hours. and Troponin   Recent Labs   Lab 01/20/20  2322 01/21/20  0400 01/21/20  1000   TROPONINI 0.308* 0.446* 0.444*       Significant Imaging: Echocardiogram:   Transthoracic echo (TTE) complete (Cupid Only):   Results for orders placed or performed during the hospital encounter of 01/20/20   Echo Color Flow Doppler? Yes   Result Value Ref Range    BSA 1.92 m2    TDI SEPTAL 0.05 m/s    LV LATERAL E/E' RATIO 11.83 m/s    LV SEPTAL E/E' RATIO 14.20 m/s    LA WIDTH 3.10 cm    AORTIC VALVE CUSP SEPERATION 1.81 cm    TDI LATERAL 0.06 m/s    PV PEAK VELOCITY 0.87 cm/s    LVIDD 3.41 (A) 3.5 - 6.0 cm    IVS 1.66 (A) 0.6 - 1.1 cm    PW 1.76 (A) 0.6 - 1.1 cm    Ao root annulus 3.09 cm    LVIDS 2.99 2.1 - 4.0 cm    FS 12 28 - 44 %    LA volume 52.60 cm3    Sinus 3.07 cm    STJ 2.22 cm    Ascending aorta 3.01 cm    LV mass 232.69 g    LA size 3.30 cm    RVDD 2.81 cm    TAPSE 1.03 cm    RV S' 11.46 cm/s    Left Ventricle Relative Wall Thickness 1.03 cm    AV mean gradient 2 mmHg    AV valve area 3.59 cm2    AV Velocity Ratio 1.11     AV index (prosthetic) 1.09     Mean e' 0.06 m/s    IVRT 0.07 msec    LVOT diameter 2.05 cm    LVOT area 3.3 cm2    LVOT peak chinmay 1.01 m/s    LVOT peak VTI 15.99 cm    Ao peak chinmay 0.91 m/s    Ao VTI 14.68 cm    LVOT stroke volume 52.75 cm3    AV peak gradient 3 mmHg    E/E' ratio 12.91 m/s    MV Peak E Chinmay 0.71 m/s    TR Max Chinmay 2.46 m/s    LV Systolic Volume 34.70 mL    LV Systolic Volume Index 18.3 mL/m2    LV Diastolic Volume 47.62 mL    LV Diastolic Volume Index 25.18 mL/m2    LA  Volume Index 27.8 mL/m2    LV Mass Index 123 g/m2    RA Major Axis 5.46 cm    Left Atrium Minor Axis 6.00 cm    Left Atrium Major Axis 6.10 cm    Triscuspid Valve Regurgitation Peak Gradient 24 mmHg    RA Width 2.43 cm    Narrative    · Concentric left ventricular hypertrophy.  · Atrial fibrillation observed.  · Moderately decreased left ventricular systolic function. The estimated   ejection fraction is 35%.  · Normal right ventricular systolic function.  · Moderate mitral regurgitation.  · Moderate tricuspid regurgitation.  · No pulmonary hypertension present.        Assessment and Plan:     * Cardiac arrest  Unclear etiology. Report of hypoxemia, bradycardia leading to PEA. Hypothermia protocol.    Acute systolic congestive heart failure  Requiring dopamine. Wean as tolerated.     CKD (chronic kidney disease)  Creatinine 1.9. Monitor in light of hypotension, PEA.    Elevated troponin I level  In light of hypotension. LFTs elevated. Troponins appear flat.    Atrial flutter  Rate better today. His EF in 35%. Requiring dopamine right now. If HR b/c elevated again would use amiodarone.        VTE Risk Mitigation (From admission, onward)         Ordered     enoxaparin injection 80 mg  Every 12 hours (non-standard times)      01/20/20 2119     IP VTE HIGH RISK PATIENT  Once      01/20/20 2119                Thank you for your consult. I will follow-up with patient. Please contact us if you have any additional questions.    Patrice Brunner MD  Cardiology   [facility]     negative

## 2021-10-05 NOTE — H&P PST ADULT - PROBLEM SELECTOR PLAN 1
Pt is scheduled for a resection of left groin mass with Dr. Rodriguez on 10/11/21  Covid PCR test scheduled for 10/8/21 at [facility]

## 2021-10-08 ENCOUNTER — OUTPATIENT (OUTPATIENT)
Dept: OUTPATIENT SERVICES | Facility: HOSPITAL | Age: 31
LOS: 1 days | End: 2021-10-08
Payer: COMMERCIAL

## 2021-10-08 DIAGNOSIS — Z98.890 OTHER SPECIFIED POSTPROCEDURAL STATES: Chronic | ICD-10-CM

## 2021-10-08 DIAGNOSIS — Z11.52 ENCOUNTER FOR SCREENING FOR COVID-19: ICD-10-CM

## 2021-10-08 LAB — SARS-COV-2 RNA SPEC QL NAA+PROBE: SIGNIFICANT CHANGE UP

## 2021-10-08 PROCEDURE — U0003: CPT

## 2021-10-08 PROCEDURE — C9803: CPT

## 2021-10-08 PROCEDURE — U0005: CPT

## 2021-10-10 ENCOUNTER — TRANSCRIPTION ENCOUNTER (OUTPATIENT)
Age: 31
End: 2021-10-10

## 2021-10-10 RX ORDER — IBUPROFEN 200 MG
600 TABLET ORAL ONCE
Refills: 0 | Status: DISCONTINUED | OUTPATIENT
Start: 2021-10-11 | End: 2021-10-25

## 2021-10-10 RX ORDER — SODIUM CHLORIDE 9 MG/ML
1000 INJECTION, SOLUTION INTRAVENOUS
Refills: 0 | Status: DISCONTINUED | OUTPATIENT
Start: 2021-10-11 | End: 2021-10-25

## 2021-10-11 ENCOUNTER — RESULT REVIEW (OUTPATIENT)
Age: 31
End: 2021-10-11

## 2021-10-11 ENCOUNTER — APPOINTMENT (OUTPATIENT)
Dept: ORTHOPEDIC SURGERY | Facility: HOSPITAL | Age: 31
End: 2021-10-11

## 2021-10-11 ENCOUNTER — OUTPATIENT (OUTPATIENT)
Dept: OUTPATIENT SERVICES | Facility: HOSPITAL | Age: 31
LOS: 1 days | End: 2021-10-11
Payer: COMMERCIAL

## 2021-10-11 VITALS
TEMPERATURE: 98 F | RESPIRATION RATE: 16 BRPM | WEIGHT: 233.03 LBS | DIASTOLIC BLOOD PRESSURE: 85 MMHG | HEIGHT: 70 IN | OXYGEN SATURATION: 100 % | SYSTOLIC BLOOD PRESSURE: 133 MMHG | HEART RATE: 77 BPM

## 2021-10-11 VITALS — DIASTOLIC BLOOD PRESSURE: 62 MMHG | SYSTOLIC BLOOD PRESSURE: 119 MMHG

## 2021-10-11 DIAGNOSIS — Z98.890 OTHER SPECIFIED POSTPROCEDURAL STATES: Chronic | ICD-10-CM

## 2021-10-11 DIAGNOSIS — R19.09 OTHER INTRA-ABDOMINAL AND PELVIC SWELLING, MASS AND LUMP: ICD-10-CM

## 2021-10-11 PROCEDURE — 27049 RESECT HIP/PELV TUM < 5 CM: CPT

## 2021-10-11 PROCEDURE — 88305 TISSUE EXAM BY PATHOLOGIST: CPT

## 2021-10-11 PROCEDURE — 88305 TISSUE EXAM BY PATHOLOGIST: CPT | Mod: 26

## 2021-10-11 PROCEDURE — 22902 EXC ABD LES SC < 3 CM: CPT

## 2021-10-11 RX ORDER — CEFAZOLIN SODIUM 1 G
2000 VIAL (EA) INJECTION ONCE
Refills: 0 | Status: COMPLETED | OUTPATIENT
Start: 2021-10-11 | End: 2021-10-11

## 2021-10-11 RX ORDER — OXYCODONE HYDROCHLORIDE 5 MG/1
1 TABLET ORAL
Qty: 16 | Refills: 0
Start: 2021-10-11 | End: 2021-10-14

## 2021-10-11 RX ORDER — CHLORHEXIDINE GLUCONATE 213 G/1000ML
1 SOLUTION TOPICAL ONCE
Refills: 0 | Status: COMPLETED | OUTPATIENT
Start: 2021-10-11 | End: 2021-10-11

## 2021-10-11 RX ORDER — LIDOCAINE HCL 20 MG/ML
0.2 VIAL (ML) INJECTION ONCE
Refills: 0 | Status: COMPLETED | OUTPATIENT
Start: 2021-10-11 | End: 2021-10-11

## 2021-10-11 RX ADMIN — CHLORHEXIDINE GLUCONATE 1 APPLICATION(S): 213 SOLUTION TOPICAL at 12:38

## 2021-10-11 NOTE — ASU PATIENT PROFILE, ADULT - NSICDXPASTMEDICALHX_GEN_ALL_CORE_FT
PAST MEDICAL HISTORY:  Inguinal lymphadenopathy left side    Obese BMI 33.4    Unspecified fracture of the lower end of left radius, initial encounter for closed fracture s/p ORIF 1/2019    Unspecified fracture of the lower end of right radius, initial encounter for closed fracture s/p ORIF 1/2019

## 2021-10-11 NOTE — ASU DISCHARGE PLAN (ADULT/PEDIATRIC) - ASU DC SPECIAL INSTRUCTIONSFT
pain control as needed  wbat  keep dressing clean and dry until follow up  follow up in 7-10 days with Dr. Rodriguez or as previously scheduled

## 2021-10-11 NOTE — ASU DISCHARGE PLAN (ADULT/PEDIATRIC) - CARE PROVIDER_API CALL
Jason Rodriguez (MD)  Orthopaedic Surgery  [address], [address]  [place] [address]  Phone: [phone]  Fax: [phone]  Follow Up Time:

## 2021-10-11 NOTE — BRIEF OPERATIVE NOTE - NSICDXBRIEFPROCEDURE_GEN_ALL_CORE_FT
PROCEDURES:  Excision, subcutaneous tumor, pelvis and hip area, less than 3 cm 11-Oct-2021 14:59:16  Adam Owens

## 2021-10-11 NOTE — ASU DISCHARGE PLAN (ADULT/PEDIATRIC) - CALL YOUR DOCTOR IF YOU HAVE ANY OF THE FOLLOWING:
Bleeding that does not stop/Swelling that gets worse/Pain not relieved by Medications/Wound/Surgical Site with redness, or foul smelling discharge or pus/Nausea and vomiting that does not stop/Increased irritability or sluggishness

## 2021-10-11 NOTE — ASU PREOP CHECKLIST -  HIBICLENS SHOWER 2 DATE
10-Oct-2021 Consent (Temporal Branch)/Introductory Paragraph: The rationale for Mohs was explained to the patient and consent was obtained. The risks, benefits and alternatives to therapy were discussed in detail. Specifically, the risks of damage to the temporal branch of the facial nerve, infection, scarring, bleeding, prolonged wound healing, incomplete removal, allergy to anesthesia, and recurrence were addressed. Prior to the procedure, the treatment site was clearly identified and confirmed by the patient. All components of Universal Protocol/PAUSE Rule completed.

## 2021-10-11 NOTE — PRE-ANESTHESIA EVALUATION ADULT - LAST ECHOCARDIOGRAM
Possibly 5 years ago due to abnormal EKG showing an enlarged heart - as per patient, results were normal

## 2021-10-23 LAB — SURGICAL PATHOLOGY STUDY: SIGNIFICANT CHANGE UP

## 2021-10-25 ENCOUNTER — APPOINTMENT (OUTPATIENT)
Dept: ORTHOPEDIC SURGERY | Facility: CLINIC | Age: 31
End: 2021-10-25
Payer: COMMERCIAL

## 2021-10-25 PROCEDURE — 99024 POSTOP FOLLOW-UP VISIT: CPT

## 2021-10-26 NOTE — HISTORY OF PRESENT ILLNESS
[___ Weeks Post Op] : [unfilled] weeks post op [Clean/Dry/Intact] : clean, dry and intact [Doing Well] : is doing well [Excellent Pain Control] : has excellent pain control [No Sign of Infection] : is showing no signs of infection [de-identified] : 10/11/2021 - resection of left groin mass [de-identified] : Patient is doing very well postoperatively.  He feels that in the area of the resection there is some thickening but he has no pain. [de-identified] : On exam incisions clean dry and intact.  He is stable with good range of motion.  He does have some thickening directly under the skin but this is minimally tender.  There is no erythema. [de-identified] : Pathology is consistent with adipose tissue with fat necrosis. [de-identified] : Patient is doing well postoperatively.  My recommendation is to continue doing range of motion exercises.  I would also do massage gently in the area.  I will see him again in a month or as needed.  Of note the mother also replies having making significant keloid scars. [de-identified] :   If imaging was ordered, the patient was told to make an appointment to review findings right after all imaging is completed.  We discussed risks, benefits and alternatives. Rationale of care was reviewed and all questions were answered. Patient (and family) had all questions answered to her degree of the level of satisfaction. Patient (and family) expressed understanding and interest in proceeding with the plan as outlined.     This note was done with a voice recognition transcription software and any typos are related to this rather than medical error. Surgical risks reviewed. Patient (and family) had all questions answered to an agreeable level of satisfaction. Patient (and family) expressed understanding and interest in proceeding with the plan as outlined.

## 2022-01-25 NOTE — H&P PST ADULT - NS NEC GEN PE MLT EXAM PC
How Severe Are They?: moderate
Is This A New Presentation, Or A Follow-Up?: Follow Up Actinic Keratoses
No bruits; no thyromegaly or nodules

## 2022-04-11 PROBLEM — Z11.59 SCREENING FOR VIRAL DISEASE: Status: ACTIVE | Noted: 2021-04-21

## 2022-04-21 ENCOUNTER — NON-APPOINTMENT (OUTPATIENT)
Age: 32
End: 2022-04-21

## 2022-04-21 ENCOUNTER — APPOINTMENT (OUTPATIENT)
Dept: INTERNAL MEDICINE | Facility: CLINIC | Age: 32
End: 2022-04-21
Payer: COMMERCIAL

## 2022-04-21 VITALS
BODY MASS INDEX: 32.93 KG/M2 | WEIGHT: 230 LBS | DIASTOLIC BLOOD PRESSURE: 80 MMHG | HEIGHT: 70 IN | OXYGEN SATURATION: 98 % | RESPIRATION RATE: 13 BRPM | HEART RATE: 78 BPM | SYSTOLIC BLOOD PRESSURE: 125 MMHG

## 2022-04-21 DIAGNOSIS — Z13.31 ENCOUNTER FOR SCREENING FOR DEPRESSION: ICD-10-CM

## 2022-04-21 DIAGNOSIS — Z13.1 ENCOUNTER FOR SCREENING FOR DIABETES MELLITUS: ICD-10-CM

## 2022-04-21 DIAGNOSIS — Z13.39 ENCOUNTER FOR SCREENING EXAM FOR OTHER MENTAL HEALTH AND BEHAVIORAL DISORDERS: ICD-10-CM

## 2022-04-21 PROBLEM — R59.0 LOCALIZED ENLARGED LYMPH NODES: Chronic | Status: ACTIVE | Noted: 2021-10-05

## 2022-04-21 PROBLEM — E66.9 OBESITY, UNSPECIFIED: Chronic | Status: ACTIVE | Noted: 2021-10-05

## 2022-04-21 PROBLEM — S52.501A UNSPECIFIED FRACTURE OF THE LOWER END OF RIGHT RADIUS, INITIAL ENCOUNTER FOR CLOSED FRACTURE: Chronic | Status: ACTIVE | Noted: 2019-01-24

## 2022-04-21 PROBLEM — S52.502A UNSPECIFIED FRACTURE OF THE LOWER END OF LEFT RADIUS, INITIAL ENCOUNTER FOR CLOSED FRACTURE: Chronic | Status: ACTIVE | Noted: 2019-01-23

## 2022-04-21 PROCEDURE — 36415 COLL VENOUS BLD VENIPUNCTURE: CPT

## 2022-04-21 PROCEDURE — G0444 DEPRESSION SCREEN ANNUAL: CPT | Mod: 59

## 2022-04-21 PROCEDURE — 93000 ELECTROCARDIOGRAM COMPLETE: CPT | Mod: 59

## 2022-04-21 PROCEDURE — G0442 ANNUAL ALCOHOL SCREEN 15 MIN: CPT

## 2022-04-21 PROCEDURE — 99395 PREV VISIT EST AGE 18-39: CPT | Mod: 25

## 2022-04-22 ENCOUNTER — NON-APPOINTMENT (OUTPATIENT)
Age: 32
End: 2022-04-22

## 2022-04-22 ENCOUNTER — TRANSCRIPTION ENCOUNTER (OUTPATIENT)
Age: 32
End: 2022-04-22

## 2022-04-22 LAB
ALBUMIN SERPL ELPH-MCNC: 4.5 G/DL
ALP BLD-CCNC: 56 U/L
ALT SERPL-CCNC: 21 U/L
ANION GAP SERPL CALC-SCNC: 10 MMOL/L
APPEARANCE: CLEAR
AST SERPL-CCNC: 15 U/L
BACTERIA: NEGATIVE
BASOPHILS # BLD AUTO: 0.03 K/UL
BASOPHILS NFR BLD AUTO: 0.4 %
BILIRUB SERPL-MCNC: <0.2 MG/DL
BILIRUBIN URINE: NEGATIVE
BLOOD URINE: NEGATIVE
BUN SERPL-MCNC: 16 MG/DL
CALCIUM SERPL-MCNC: 9.8 MG/DL
CHLORIDE SERPL-SCNC: 105 MMOL/L
CHOLEST SERPL-MCNC: 217 MG/DL
CO2 SERPL-SCNC: 24 MMOL/L
COLOR: NORMAL
CREAT SERPL-MCNC: 0.98 MG/DL
EGFR: 104 ML/MIN/1.73M2
EOSINOPHIL # BLD AUTO: 0.16 K/UL
EOSINOPHIL NFR BLD AUTO: 2.3 %
ESTIMATED AVERAGE GLUCOSE: 120 MG/DL
GLUCOSE QUALITATIVE U: NEGATIVE
GLUCOSE SERPL-MCNC: 104 MG/DL
HBA1C MFR BLD HPLC: 5.8 %
HCT VFR BLD CALC: 46.3 %
HDLC SERPL-MCNC: 56 MG/DL
HGB BLD-MCNC: 14.7 G/DL
HYALINE CASTS: 0 /LPF
IMM GRANULOCYTES NFR BLD AUTO: 0.1 %
KETONES URINE: NEGATIVE
LDLC SERPL CALC-MCNC: 144 MG/DL
LEUKOCYTE ESTERASE URINE: NEGATIVE
LYMPHOCYTES # BLD AUTO: 3.08 K/UL
LYMPHOCYTES NFR BLD AUTO: 44 %
MAN DIFF?: NORMAL
MCHC RBC-ENTMCNC: 28.5 PG
MCHC RBC-ENTMCNC: 31.7 GM/DL
MCV RBC AUTO: 89.7 FL
MICROSCOPIC-UA: NORMAL
MONOCYTES # BLD AUTO: 0.82 K/UL
MONOCYTES NFR BLD AUTO: 11.7 %
NEUTROPHILS # BLD AUTO: 2.9 K/UL
NEUTROPHILS NFR BLD AUTO: 41.5 %
NITRITE URINE: NEGATIVE
NONHDLC SERPL-MCNC: 160 MG/DL
PH URINE: 5.5
PLATELET # BLD AUTO: 269 K/UL
POTASSIUM SERPL-SCNC: 4.3 MMOL/L
PROT SERPL-MCNC: 7.3 G/DL
PROTEIN URINE: NEGATIVE
RBC # BLD: 5.16 M/UL
RBC # FLD: 12.9 %
RED BLOOD CELLS URINE: 1 /HPF
SODIUM SERPL-SCNC: 139 MMOL/L
SPECIFIC GRAVITY URINE: 1.02
SQUAMOUS EPITHELIAL CELLS: 0 /HPF
TRIGL SERPL-MCNC: 79 MG/DL
UROBILINOGEN URINE: NORMAL
WBC # FLD AUTO: 7 K/UL
WHITE BLOOD CELLS URINE: 1 /HPF

## 2022-04-22 NOTE — HISTORY OF PRESENT ILLNESS
[de-identified] : 31-year-old male with good general health presents for his yearly physical.\par \par Patient general health is good without any chronic medical issues including no cardiopulmonary, hepatorenal, hematological or diabetes history.\par He had made some good lifestyle changes without 20 pounds of weight loss unfortunately has gained it back\par \par Patient does have a remote history of EBV without sequelae.\par \par Generally the patient feels well without chest pain, palpitations, shortness of breath or edema.\par \par The patient did have a left groin mass ultimately which was removed October 2021 with pathology = fatty necrosis.\par \par he continues to complain of fatigue with his girlfriend stating that he snores very loudly and on direct questioning he does have vivid dreams with morning headaches, daytime somnolence with no witnessed apneic periods\par \par at patient's last physical he complained of GI symptoms which I felt was secondary to IBS.\par Recommended food diary and noting culprit foods which the patient has done and his symptoms are much improved\par \par The patient is single but is now engaged and still lives at home with his parents. 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 He was out of work for 3 months

## 2022-04-22 NOTE — PHYSICAL EXAM
[Well Nourished] : well nourished [Well Developed] : well developed [Well-Appearing] : well-appearing [Normal Sclera/Conjunctiva] : normal sclera/conjunctiva [PERRL] : pupils equal round and reactive to light [EOMI] : extraocular movements intact [Normal Outer Ear/Nose] : the outer ears and nose were normal in appearance [Normal Oropharynx] : the oropharynx was normal [No JVD] : no jugular venous distention [No Lymphadenopathy] : no lymphadenopathy [Supple] : supple [Thyroid Normal, No Nodules] : the thyroid was normal and there were no nodules present [No Respiratory Distress] : no respiratory distress  [No Accessory Muscle Use] : no accessory muscle use [Clear to Auscultation] : lungs were clear to auscultation bilaterally [Normal Rate] : normal rate  [Regular Rhythm] : with a regular rhythm [Normal S1, S2] : normal S1 and S2 [No Murmur] : no murmur heard [No Carotid Bruits] : no carotid bruits [No Abdominal Bruit] : a ~M bruit was not heard ~T in the abdomen [No Varicosities] : no varicosities [Pedal Pulses Present] : the pedal pulses are present [No Edema] : there was no peripheral edema [No Palpable Aorta] : no palpable aorta [No Extremity Clubbing/Cyanosis] : no extremity clubbing/cyanosis [Soft] : abdomen soft [Non Tender] : non-tender [Non-distended] : non-distended [No Masses] : no abdominal mass palpated [No HSM] : no HSM [Normal Bowel Sounds] : normal bowel sounds [Scrotum] : the scrotum was normal [Rectal Exam - Seminal Vesicles] : the seminal vesicles were normal [Epididymis] : the epididymides were normal [Testes Tenderness] : no tenderness of the testes [Testes Mass (___cm)] : there were no testicular masses [Normal Posterior Cervical Nodes] : no posterior cervical lymphadenopathy [Normal Anterior Cervical Nodes] : no anterior cervical lymphadenopathy [No CVA Tenderness] : no CVA  tenderness [No Spinal Tenderness] : no spinal tenderness [No Joint Swelling] : no joint swelling [Grossly Normal Strength/Tone] : grossly normal strength/tone [No Rash] : no rash [Coordination Grossly Intact] : coordination grossly intact [No Focal Deficits] : no focal deficits [Normal Gait] : normal gait [Deep Tendon Reflexes (DTR)] : deep tendon reflexes were 2+ and symmetric [Speech Grossly Normal] : speech grossly normal [Normal Affect] : the affect was normal [Normal Mood] : the mood was normal [Normal Insight/Judgement] : insight and judgment were intact [de-identified] : obese [de-identified] : Tattoo left upper

## 2022-04-22 NOTE — HEALTH RISK ASSESSMENT
[Good] : ~his/her~ current health as good [Never] : Never [Yes] : Yes [2 - 4 times a month (2 pts)] : 2-4 times a month (2 points) [1 or 2 (0 pts)] : 1 or 2 (0 points) [Never (0 pts)] : Never (0 points) [No] : In the past 12 months have you used drugs other than those required for medical reasons? No [No falls in past year] : Patient reported no falls in the past year [0] : 2) Feeling down, depressed, or hopeless: Not at all (0) [None] : None [With Family] : lives with family [Employed] : employed [College] : College [Single] : single [Sexually Active] : sexually active [Fully functional (bathing, dressing, toileting, transferring, walking, feeding)] : Fully functional (bathing, dressing, toileting, transferring, walking, feeding) [Fully functional (using the telephone, shopping, preparing meals, housekeeping, doing laundry, using] : Fully functional and needs no help or supervision to perform IADLs (using the telephone, shopping, preparing meals, housekeeping, doing laundry, using transportation, managing medications and managing finances) [Smoke Detector] : smoke detector [Carbon Monoxide Detector] : carbon monoxide detector [Sunscreen] : uses sunscreen [Very Good] : ~his/her~  mood as very good [PHQ-2 Negative - No further assessment needed] : PHQ-2 Negative - No further assessment needed [Designated Healthcare Proxy] : Designated healthcare proxy [Name: ___] : Health Care Proxy's Name: [unfilled]  [Audit-CScore] : 2 [de-identified] : occ exercise [de-identified] : fair [[id number]] : 0 [Change in mental status noted] : No change in mental status noted [High Risk Behavior] : no high risk behavior [Reports changes in hearing] : Reports no changes in hearing [Reports changes in vision] : Reports no changes in vision [Reports changes in dental health] : Reports no changes in dental health [Seat Belt] : does not use seat belt [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

## 2022-04-22 NOTE — ADDENDUM
[FreeTextEntry1] : Labs good on empiric cholesterol at 217 with increased glucose and hemoglobin A1c = 5.8 and prediabetic range.\par Patient told me further diet and exercise\par Recheck fasting glucose, hemoglobin A1c and lipid profile in 4 months

## 2022-04-22 NOTE — ASSESSMENT
[FreeTextEntry1] : 31-year-old male with good general health who needs lifestyle changes with better diet exercise and weight loss.\par \par Patient fatigued possibly with no significant cause other than needing lifestyle changes with weight loss.\par  testosterone and TSH checked which were normal.\par \par Patient with symptoms that could be compatible with sleep apnea with daytime fatigue and somnolence therefore sleep study ordered.\par \par iBS with GI symptoms improved with dietary changes\par \par Patient does not use seat belts therefore recommend patient using a seatbelt every time he drives.\par \par Influenza vaccine declined this year\par COVID vaccine x 2 but not the booster as yet. Recommended that he get it\par Tdap UTD\par \par Venipuncture done today in the office\par \par Followup yearly and as needed

## 2022-05-06 ENCOUNTER — APPOINTMENT (OUTPATIENT)
Age: 32
End: 2022-05-06
Payer: COMMERCIAL

## 2022-05-06 ENCOUNTER — OUTPATIENT (OUTPATIENT)
Dept: OUTPATIENT SERVICES | Facility: HOSPITAL | Age: 32
LOS: 1 days | End: 2022-05-06
Payer: COMMERCIAL

## 2022-05-06 DIAGNOSIS — Z98.890 OTHER SPECIFIED POSTPROCEDURAL STATES: Chronic | ICD-10-CM

## 2022-05-06 DIAGNOSIS — G47.33 OBSTRUCTIVE SLEEP APNEA (ADULT) (PEDIATRIC): ICD-10-CM

## 2022-05-06 PROCEDURE — 95810 POLYSOM 6/> YRS 4/> PARAM: CPT

## 2022-05-06 PROCEDURE — 95810 POLYSOM 6/> YRS 4/> PARAM: CPT | Mod: 26

## 2022-05-20 ENCOUNTER — NON-APPOINTMENT (OUTPATIENT)
Age: 32
End: 2022-05-20

## 2022-05-26 NOTE — ED PROVIDER NOTE - NS ED MD DISPO DISCHARGE
· Patient was found unresponsive and hypoxic approximately 20 minutes after being seen well with family 5/21   · PEA arrest x 2  · Most likely respiratory driven  · Neurologically intact post arrest   · Hypotension post ROSC requiring levo, vaso, and epi and stress dose steroids  · Hydrocortisone d/c 5/23  · Remains on levophed to maintain MAP > 65 Home

## 2022-08-18 NOTE — H&P PST ADULT - REASON FOR ADMISSION
Refill requested for:     Citalopram 40 mg    Last filled on:     5/11/2022 #90 0 refills  Last office visit:    4/7/2022  Pending office visit none    Please advise on refills.  
"Surgery for broken left wrist"

## 2022-09-01 ENCOUNTER — APPOINTMENT (OUTPATIENT)
Dept: INTERNAL MEDICINE | Facility: CLINIC | Age: 32
End: 2022-09-01

## 2022-10-03 ENCOUNTER — OFFICE (OUTPATIENT)
Dept: URBAN - METROPOLITAN AREA CLINIC 115 | Facility: CLINIC | Age: 32
Setting detail: OPHTHALMOLOGY
End: 2022-10-03
Payer: COMMERCIAL

## 2022-10-03 DIAGNOSIS — H10.45: ICD-10-CM

## 2022-10-03 DIAGNOSIS — G43.109: ICD-10-CM

## 2022-10-03 DIAGNOSIS — H01.001: ICD-10-CM

## 2022-10-03 DIAGNOSIS — H01.004: ICD-10-CM

## 2022-10-03 PROCEDURE — 92004 COMPRE OPH EXAM NEW PT 1/>: CPT | Performed by: OPHTHALMOLOGY

## 2022-10-03 ASSESSMENT — REFRACTION_CURRENTRX
OS_AXIS: 003
OD_OVR_VA: 20/
OD_SPHERE: +2.00
OD_CYLINDER: -0.50
OD_AXIS: 179
OS_CYLINDER: -1.00
OS_OVR_VA: 20/
OS_SPHERE: +3.00

## 2022-10-03 ASSESSMENT — REFRACTION_AUTOREFRACTION
OS_CYLINDER: -1.50
OD_AXIS: 169
OD_SPHERE: +3.75
OD_CYLINDER: -0.75
OS_SPHERE: +4.50
OS_AXIS: 002

## 2022-10-03 ASSESSMENT — SPHEQUIV_DERIVED
OD_SPHEQUIV: 3.375
OD_SPHEQUIV: 1.75
OS_SPHEQUIV: 2.5
OS_SPHEQUIV: 3.75

## 2022-10-03 ASSESSMENT — REFRACTION_MANIFEST
OD_SPHERE: +2.00
OD_CYLINDER: -0.50
OS_AXIS: 180
OD_AXIS: 165
OS_SPHERE: +3.00
OS_CYLINDER: -1.00

## 2022-10-03 ASSESSMENT — VISUAL ACUITY
OS_BCVA: 20/25
OD_BCVA: 20/30

## 2022-10-03 ASSESSMENT — CONFRONTATIONAL VISUAL FIELD TEST (CVF)
OD_FINDINGS: FULL
OS_FINDINGS: FULL

## 2022-10-03 ASSESSMENT — TONOMETRY
OD_IOP_MMHG: 12
OS_IOP_MMHG: 12

## 2022-10-03 ASSESSMENT — LID EXAM ASSESSMENTS
OS_BLEPHARITIS: LUL T 1+
OD_BLEPHARITIS: RUL T 1+

## 2022-10-13 PROBLEM — Z13.31 SCREENING FOR DEPRESSION: Status: ACTIVE | Noted: 2022-04-21

## 2023-04-30 NOTE — COUNSELING
[Benefits of weight loss discussed] : Benefits of weight loss discussed [Potential consequences of obesity discussed] : Potential consequences of obesity discussed [Encouraged to increase physical activity] : Encouraged to increase physical activity [None] : None [Needs reinforcement, provided] : Patient needs reinforcement on understanding of lifestyle changes and steps needed to achieve self management goal; reinforcement was provided

## 2023-05-01 ENCOUNTER — APPOINTMENT (OUTPATIENT)
Dept: INTERNAL MEDICINE | Facility: CLINIC | Age: 33
End: 2023-05-01
Payer: COMMERCIAL

## 2023-05-01 ENCOUNTER — NON-APPOINTMENT (OUTPATIENT)
Age: 33
End: 2023-05-01

## 2023-05-01 VITALS
OXYGEN SATURATION: 98 % | SYSTOLIC BLOOD PRESSURE: 138 MMHG | BODY MASS INDEX: 32.93 KG/M2 | HEART RATE: 77 BPM | WEIGHT: 230 LBS | HEIGHT: 70 IN | DIASTOLIC BLOOD PRESSURE: 90 MMHG | RESPIRATION RATE: 14 BRPM

## 2023-05-01 DIAGNOSIS — Z13.6 ENCOUNTER FOR SCREENING FOR CARDIOVASCULAR DISORDERS: ICD-10-CM

## 2023-05-01 DIAGNOSIS — G47.9 SLEEP DISORDER, UNSPECIFIED: ICD-10-CM

## 2023-05-01 DIAGNOSIS — R73.01 IMPAIRED FASTING GLUCOSE: ICD-10-CM

## 2023-05-01 DIAGNOSIS — Z00.00 ENCOUNTER FOR GENERAL ADULT MEDICAL EXAMINATION W/OUT ABNORMAL FINDINGS: ICD-10-CM

## 2023-05-01 PROCEDURE — 99395 PREV VISIT EST AGE 18-39: CPT | Mod: 25

## 2023-05-01 PROCEDURE — 36415 COLL VENOUS BLD VENIPUNCTURE: CPT

## 2023-05-01 PROCEDURE — 93000 ELECTROCARDIOGRAM COMPLETE: CPT

## 2023-05-01 NOTE — H&P PST ADULT - DENTITION
Instructions: This plan will send the code FBSD to the PM system.  DO NOT or CHANGE the price. Price (Do Not Change): 0.00 Detail Level: Simple No loose teeth or dentures/normal

## 2023-05-01 NOTE — ASSESSMENT
[FreeTextEntry1] : 32-year-old male with good general health who needs lifestyle changes with better diet exercise and weight loss.\par \par Patient with daytime fatigue which continues with sleep study May 2022 mildly abnormal but with symptoms may need treatment.  Referral given to see sleep specialist.\par \par IBS with GI symptoms improved with dietary changes\par \par Patient does not use seat belts therefore recommend patient using a seatbelt every time he drives.\par \par Influenza vaccine declined this year\par COVID vaccine x 2 \par Tdap UTD\par \par Venipuncture done today in the office\par \par Followup yearly and as needed

## 2023-05-01 NOTE — PHYSICAL EXAM
[Well Nourished] : well nourished [Well Developed] : well developed [Well-Appearing] : well-appearing [Normal Sclera/Conjunctiva] : normal sclera/conjunctiva [PERRL] : pupils equal round and reactive to light [EOMI] : extraocular movements intact [Normal Outer Ear/Nose] : the outer ears and nose were normal in appearance [Normal Oropharynx] : the oropharynx was normal [No JVD] : no jugular venous distention [No Lymphadenopathy] : no lymphadenopathy [Supple] : supple [Thyroid Normal, No Nodules] : the thyroid was normal and there were no nodules present [No Respiratory Distress] : no respiratory distress  [No Accessory Muscle Use] : no accessory muscle use [Clear to Auscultation] : lungs were clear to auscultation bilaterally [Normal Rate] : normal rate  [Regular Rhythm] : with a regular rhythm [Normal S1, S2] : normal S1 and S2 [No Murmur] : no murmur heard [No Carotid Bruits] : no carotid bruits [No Abdominal Bruit] : a ~M bruit was not heard ~T in the abdomen [No Varicosities] : no varicosities [Pedal Pulses Present] : the pedal pulses are present [No Edema] : there was no peripheral edema [No Palpable Aorta] : no palpable aorta [No Extremity Clubbing/Cyanosis] : no extremity clubbing/cyanosis [Soft] : abdomen soft [Non Tender] : non-tender [Non-distended] : non-distended [No HSM] : no HSM [No Masses] : no abdominal mass palpated [Normal Bowel Sounds] : normal bowel sounds [Scrotum] : the scrotum was normal [Rectal Exam - Seminal Vesicles] : the seminal vesicles were normal [Epididymis] : the epididymides were normal [Testes Tenderness] : no tenderness of the testes [No CVA Tenderness] : no CVA  tenderness [Testes Mass (___cm)] : there were no testicular masses [No Spinal Tenderness] : no spinal tenderness [No Joint Swelling] : no joint swelling [Grossly Normal Strength/Tone] : grossly normal strength/tone [No Rash] : no rash [Coordination Grossly Intact] : coordination grossly intact [No Focal Deficits] : no focal deficits [Normal Gait] : normal gait [Deep Tendon Reflexes (DTR)] : deep tendon reflexes were 2+ and symmetric [Speech Grossly Normal] : speech grossly normal [Normal Affect] : the affect was normal [Normal Mood] : the mood was normal [Normal Insight/Judgement] : insight and judgment were intact [de-identified] : obese [de-identified] : Tattoo left upper

## 2023-05-01 NOTE — HISTORY OF PRESENT ILLNESS
[de-identified] : 32-year-old male with good general health presents for his yearly physical.\par \par Patient general health is good without any chronic medical issues including no cardiopulmonary, hepatorenal, hematological or diabetes history.\par He had made some good lifestyle changes without 20 pounds of weight loss unfortunately has gained it back\par \par Patient does have a remote history of EBV without sequelae.\par \par Generally the patient feels well without chest pain, palpitations, shortness of breath or edema.\par \par The patient did have a left groin mass which ultimately was removed October 2021 with 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  Sleep specialist felt patient did not need treatment unless continued significant daytime fatigue. He states he still fatigued.\par \par Patient had GI symptoms that were most compatible with IBS and has made some dietary changes with resolution of his symptoms.\par \par The patient is single but is now engaged and still lives at home with his parents. 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 He was out of work for 3 months

## 2023-05-01 NOTE — HEALTH RISK ASSESSMENT
[Good] : ~his/her~ current health as good [Very Good] : ~his/her~  mood as very good [Yes] : Yes [2 - 4 times a month (2 pts)] : 2-4 times a month (2 points) [1 or 2 (0 pts)] : 1 or 2 (0 points) [Never (0 pts)] : Never (0 points) [No] : In the past 12 months have you used drugs other than those required for medical reasons? No [No falls in past year] : Patient reported no falls in the past year [PHQ-2 Negative - No further assessment needed] : PHQ-2 Negative - No further assessment needed [0] : 2) Feeling down, depressed, or hopeless: Not at all (0) [None] : None [With Family] : lives with family [Employed] : employed [College] : College [Single] : single [Sexually Active] : sexually active [Fully functional (bathing, dressing, toileting, transferring, walking, feeding)] : Fully functional (bathing, dressing, toileting, transferring, walking, feeding) [Fully functional (using the telephone, shopping, preparing meals, housekeeping, doing laundry, using] : Fully functional and needs no help or supervision to perform IADLs (using the telephone, shopping, preparing meals, housekeeping, doing laundry, using transportation, managing medications and managing finances) [Smoke Detector] : smoke detector [Carbon Monoxide Detector] : carbon monoxide detector [Sunscreen] : uses sunscreen [Designated Healthcare Proxy] : Designated healthcare proxy [Name: ___] : Health Care Proxy's Name: [unfilled]  [Never] : Never [Reviewed no changes] : Reviewed, no changes [Significant Other] : lives with significant other [Audit-CScore] : 2 [de-identified] : occ exercise [de-identified] : fair [[id number]] : 0 [Change in mental status noted] : No change in mental status noted [High Risk Behavior] : no high risk behavior [Reports changes in hearing] : Reports no changes in hearing [Reports changes in vision] : Reports no changes in vision [Reports changes in dental health] : Reports no changes in dental health [Seat Belt] : does not use seat belt [FreeTextEntry2] : 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

## 2023-05-03 ENCOUNTER — NON-APPOINTMENT (OUTPATIENT)
Age: 33
End: 2023-05-03

## 2023-05-03 ENCOUNTER — TRANSCRIPTION ENCOUNTER (OUTPATIENT)
Age: 33
End: 2023-05-03

## 2023-05-03 LAB
ALBUMIN SERPL ELPH-MCNC: 5 G/DL
ALP BLD-CCNC: 51 U/L
ALT SERPL-CCNC: 22 U/L
ANION GAP SERPL CALC-SCNC: 13 MMOL/L
AST SERPL-CCNC: 20 U/L
BASOPHILS # BLD AUTO: 0.04 K/UL
BASOPHILS NFR BLD AUTO: 0.5 %
BILIRUB SERPL-MCNC: 0.3 MG/DL
BUN SERPL-MCNC: 16 MG/DL
CALCIUM SERPL-MCNC: 10.3 MG/DL
CHLORIDE SERPL-SCNC: 105 MMOL/L
CHOLEST SERPL-MCNC: 223 MG/DL
CO2 SERPL-SCNC: 25 MMOL/L
CREAT SERPL-MCNC: 1.04 MG/DL
EGFR: 97 ML/MIN/1.73M2
EOSINOPHIL # BLD AUTO: 0.07 K/UL
EOSINOPHIL NFR BLD AUTO: 0.9 %
ESTIMATED AVERAGE GLUCOSE: 120 MG/DL
GLUCOSE SERPL-MCNC: 89 MG/DL
HBA1C MFR BLD HPLC: 5.8 %
HCT VFR BLD CALC: 46.4 %
HDLC SERPL-MCNC: 63 MG/DL
HGB BLD-MCNC: 14.8 G/DL
IMM GRANULOCYTES NFR BLD AUTO: 0.1 %
LDLC SERPL CALC-MCNC: 144 MG/DL
LYMPHOCYTES # BLD AUTO: 2.74 K/UL
LYMPHOCYTES NFR BLD AUTO: 36.8 %
MAN DIFF?: NORMAL
MCHC RBC-ENTMCNC: 28.5 PG
MCHC RBC-ENTMCNC: 31.9 GM/DL
MCV RBC AUTO: 89.4 FL
MONOCYTES # BLD AUTO: 0.54 K/UL
MONOCYTES NFR BLD AUTO: 7.3 %
NEUTROPHILS # BLD AUTO: 4.04 K/UL
NEUTROPHILS NFR BLD AUTO: 54.4 %
NONHDLC SERPL-MCNC: 159 MG/DL
PLATELET # BLD AUTO: 303 K/UL
POTASSIUM SERPL-SCNC: 4.3 MMOL/L
PROT SERPL-MCNC: 7.5 G/DL
RBC # BLD: 5.19 M/UL
RBC # FLD: 13 %
SODIUM SERPL-SCNC: 142 MMOL/L
TRIGL SERPL-MCNC: 74 MG/DL
WBC # FLD AUTO: 7.44 K/UL

## 2023-06-21 ENCOUNTER — APPOINTMENT (OUTPATIENT)
Dept: PULMONOLOGY | Facility: CLINIC | Age: 33
End: 2023-06-21
Payer: COMMERCIAL

## 2023-06-21 VITALS
HEART RATE: 76 BPM | HEIGHT: 70 IN | DIASTOLIC BLOOD PRESSURE: 90 MMHG | WEIGHT: 230 LBS | RESPIRATION RATE: 14 BRPM | OXYGEN SATURATION: 99 % | BODY MASS INDEX: 32.93 KG/M2 | SYSTOLIC BLOOD PRESSURE: 140 MMHG

## 2023-06-21 DIAGNOSIS — F17.290 NICOTINE DEPENDENCE, OTHER TOBACCO PRODUCT, UNCOMPLICATED: ICD-10-CM

## 2023-06-21 DIAGNOSIS — G47.33 OBSTRUCTIVE SLEEP APNEA (ADULT) (PEDIATRIC): ICD-10-CM

## 2023-06-21 DIAGNOSIS — G47.30 HYPERSOMNIA, UNSPECIFIED: ICD-10-CM

## 2023-06-21 DIAGNOSIS — E66.9 OBESITY, UNSPECIFIED: ICD-10-CM

## 2023-06-21 DIAGNOSIS — G47.10 HYPERSOMNIA, UNSPECIFIED: ICD-10-CM

## 2023-06-21 DIAGNOSIS — Z87.891 PERSONAL HISTORY OF NICOTINE DEPENDENCE: ICD-10-CM

## 2023-06-21 PROCEDURE — 99204 OFFICE O/P NEW MOD 45 MIN: CPT

## 2023-06-21 NOTE — REVIEW OF SYSTEMS
[Obesity] : obesity [Hypersomnolence] : sleeping much more than usual [Negative] : Musculoskeletal [FreeTextEntry3] : per HPI [FreeTextEntry8] : per HPI [de-identified] : .p

## 2023-06-21 NOTE — HISTORY OF PRESENT ILLNESS
[Daytime Somnolence] : daytime somnolence [Date: ___] : Date of most recent diagnostic polysomnogram: [unfilled] [AHI: ___ per hour] : Apnea-hypopnea index:  [unfilled] per hour [FreeTextEntry1] : C/O snoring, EDS with ESS 12, morning HAs, choking/gasping, fragmented sleep. \par \par The EDS began in early 20s.\par \par No HH; No cataplexy; Occ SP,  ABs; \par \par HST done showing mild CHHAYA.\par \par To bed: 10-11\par Latency: <10 min\par WASO: multiple; goes back to sleep\par OOB: 5:30am with alarm; 7-8 w/o alarm\par Naps: yes\par \par Armor card guard. \par  [ESS] : 12

## 2023-07-17 ENCOUNTER — APPOINTMENT (OUTPATIENT)
Dept: UROLOGY | Facility: CLINIC | Age: 33
End: 2023-07-17
Payer: COMMERCIAL

## 2023-07-17 VITALS
SYSTOLIC BLOOD PRESSURE: 125 MMHG | RESPIRATION RATE: 19 BRPM | HEART RATE: 95 BPM | DIASTOLIC BLOOD PRESSURE: 79 MMHG | OXYGEN SATURATION: 99 %

## 2023-07-17 DIAGNOSIS — R79.89 OTHER SPECIFIED ABNORMAL FINDINGS OF BLOOD CHEMISTRY: ICD-10-CM

## 2023-07-17 PROCEDURE — 99203 OFFICE O/P NEW LOW 30 MIN: CPT

## 2023-07-17 NOTE — HISTORY OF PRESENT ILLNESS
[FreeTextEntry1] : 31 yo M for initial consultation\par not , no kids\par healthy\par no medication, only multivitamin \par NKDA\par not a smoker\par \par 2-3 years ago during a routine physical mentioned he feels fatigue \par at the time had testosterone tested and was on the lower side\par now mentioning he is also having problems with ED, the erection is not as strong as before\par also mentions less libido than before\par still feels fatigue as before\par reviewed previous TT from 3/2020 - total of 307\par reviewed previous TT from 1/2020 - total of 278\par \par exam today unremarkable\par \par \par \par plan:\par - repeat TT\par - test needs to be done before 9 am\par - weight loss\par - TTM in 2 weeks to discuss the need for further follow up

## 2023-07-17 NOTE — ASSESSMENT
[FreeTextEntry1] : \par plan:\par - repeat TT\par - test needs to be done before 9 am\par - weight loss\par - TTM in 2 weeks to discuss the need for further follow up

## 2023-07-31 ENCOUNTER — APPOINTMENT (OUTPATIENT)
Dept: UROLOGY | Facility: CLINIC | Age: 33
End: 2023-07-31
Payer: COMMERCIAL

## 2023-07-31 DIAGNOSIS — N52.35 ERECTILE DYSFUNCTION FOLLOWING RADIATION THERAPY: ICD-10-CM

## 2023-07-31 PROCEDURE — 99441: CPT

## 2023-07-31 NOTE — HISTORY OF PRESENT ILLNESS
[Home] : at home, [unfilled] , at the time of the visit. [Medical Office: ([facility])___] : at the medical office located in  [Verbal consent obtained from patient] : the patient, [unfilled] [FreeTextEntry1] : see previous note reviewed new TT > 300 discussed with patient will schedule appointment with LEILANI Hurt to discuss treatments for ED

## 2023-09-01 ENCOUNTER — APPOINTMENT (OUTPATIENT)
Dept: PULMONOLOGY | Facility: CLINIC | Age: 33
End: 2023-09-01

## 2023-09-21 NOTE — ED PROVIDER NOTE - CHPI ED SYMPTOMS NEG
no disorientation/no dizziness/no decreased eating/drinking/no fussiness/no difficulty bearing weight/no laceration/no loss of consciousness/no neck tenderness/no headache/no bruising No

## 2023-09-27 NOTE — ED PROVIDER NOTE - RESPIRATORY NEGATIVE STATEMENT, MLM
Patient called and wanted to let the clinic know he has gained 6 lbs in 4 days.  Spoke to Mark Aldridge, and instructed patient to contact clinic back if patient gains 5 lbs in 1 day or 10 lbs in 2 days.     no chest pain, no cough, and no shortness of breath.

## 2023-10-04 ENCOUNTER — APPOINTMENT (OUTPATIENT)
Dept: PULMONOLOGY | Facility: CLINIC | Age: 33
End: 2023-10-04

## 2023-10-09 ENCOUNTER — OFFICE (OUTPATIENT)
Dept: URBAN - METROPOLITAN AREA CLINIC 115 | Facility: CLINIC | Age: 33
Setting detail: OPHTHALMOLOGY
End: 2023-10-09
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
## 2023-10-09 PROCEDURE — NO SHOW FE NO SHOW FEE: Performed by: OPHTHALMOLOGY

## 2023-12-18 NOTE — ASU PREOP CHECKLIST - PATIENT SENT AT
Rx pended as requested.     Last refill date 8/21/23    Last MED CHECK / WELLNESS appt date 10/24/23    Requested Prescriptions   Pending Prescriptions Disp Refills    pimecrolimus (ELIDEL) 1 % cream 45 g 3     Sig: Apply topically 2 times daily.       There is no refill protocol information for this order              25-Jan-2019 10:21

## 2024-05-06 ENCOUNTER — APPOINTMENT (OUTPATIENT)
Dept: INTERNAL MEDICINE | Facility: CLINIC | Age: 34
End: 2024-05-06

## 2024-05-07 NOTE — HISTORY OF PRESENT ILLNESS
[de-identified] : 33-year-old male with good general health presents for his yearly physical.  Patient general health is good without any chronic medical issues including no cardiopulmonary, hepatorenal, hematological or diabetes history. He had made some good lifestyle changes without 20 pounds of weight loss unfortunately has gained it back  Patient does have a remote history of EBV without sequelae.  Generally the patient feels well without chest pain, palpitations, shortness of breath or edema.  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  Patient had continued daytime fatigue and girlfriend noting some sleep issues therefore patient referred and had a sleep study May 2022 mildly abnormal.  Sleep specialist felt patient did not need treatment unless continued significant daytime fatigue. He states he still fatigued.  Patient had GI symptoms that were most compatible with IBS and has made some dietary changes with resolution of his symptoms.  The patient is single but is now engaged and still lives at home with his parents. 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 He was out of work for 3 months

## 2024-05-07 NOTE — HEALTH RISK ASSESSMENT
[Good] : ~his/her~ current health as good [Very Good] : ~his/her~  mood as very good [Yes] : Yes [2 - 4 times a month (2 pts)] : 2-4 times a month (2 points) [1 or 2 (0 pts)] : 1 or 2 (0 points) [Never (0 pts)] : Never (0 points) [No] : In the past 12 months have you used drugs other than those required for medical reasons? No [No falls in past year] : Patient reported no falls in the past year [0] : 2) Feeling down, depressed, or hopeless: Not at all (0) [PHQ-2 Negative - No further assessment needed] : PHQ-2 Negative - No further assessment needed [None] : None [With Family] : lives with family [Employed] : employed [College] : College [Single] : single [Significant Other] : lives with significant other [Sexually Active] : sexually active [Fully functional (bathing, dressing, toileting, transferring, walking, feeding)] : Fully functional (bathing, dressing, toileting, transferring, walking, feeding) [Fully functional (using the telephone, shopping, preparing meals, housekeeping, doing laundry, using] : Fully functional and needs no help or supervision to perform IADLs (using the telephone, shopping, preparing meals, housekeeping, doing laundry, using transportation, managing medications and managing finances) [Smoke Detector] : smoke detector [Carbon Monoxide Detector] : carbon monoxide detector [Sunscreen] : uses sunscreen [Reviewed no changes] : Reviewed, no changes [Designated Healthcare Proxy] : Designated healthcare proxy [Name: ___] : Health Care Proxy's Name: [unfilled]  [Never] : Never [Audit-CScore] : 2 [de-identified] : occ exercise [de-identified] : fair [[id number]] : 0 [Change in mental status noted] : No change in mental status noted [High Risk Behavior] : no high risk behavior [Reports changes in hearing] : Reports no changes in hearing [Reports changes in vision] : Reports no changes in vision [Reports changes in dental health] : Reports no changes in dental health [Seat Belt] : does not use seat belt [FreeTextEntry2] : 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

## 2024-05-07 NOTE — PHYSICAL EXAM
[Well Nourished] : well nourished [Well Developed] : well developed [Well-Appearing] : well-appearing [Normal Sclera/Conjunctiva] : normal sclera/conjunctiva [PERRL] : pupils equal round and reactive to light [EOMI] : extraocular movements intact [Normal Outer Ear/Nose] : the outer ears and nose were normal in appearance [Normal Oropharynx] : the oropharynx was normal [No JVD] : no jugular venous distention [No Lymphadenopathy] : no lymphadenopathy [Supple] : supple [Thyroid Normal, No Nodules] : the thyroid was normal and there were no nodules present [No Respiratory Distress] : no respiratory distress  [No Accessory Muscle Use] : no accessory muscle use [Clear to Auscultation] : lungs were clear to auscultation bilaterally [Normal Rate] : normal rate  [Regular Rhythm] : with a regular rhythm [Normal S1, S2] : normal S1 and S2 [No Murmur] : no murmur heard [No Carotid Bruits] : no carotid bruits [No Abdominal Bruit] : a ~M bruit was not heard ~T in the abdomen [No Varicosities] : no varicosities [Pedal Pulses Present] : the pedal pulses are present [No Edema] : there was no peripheral edema [No Palpable Aorta] : no palpable aorta [No Extremity Clubbing/Cyanosis] : no extremity clubbing/cyanosis [Soft] : abdomen soft [Non Tender] : non-tender [Non-distended] : non-distended [No Masses] : no abdominal mass palpated [No HSM] : no HSM [Normal Bowel Sounds] : normal bowel sounds [Scrotum] : the scrotum was normal [Rectal Exam - Seminal Vesicles] : the seminal vesicles were normal [Epididymis] : the epididymides were normal [Testes Tenderness] : no tenderness of the testes [Testes Mass (___cm)] : there were no testicular masses [No CVA Tenderness] : no CVA  tenderness [No Spinal Tenderness] : no spinal tenderness [No Joint Swelling] : no joint swelling [Grossly Normal Strength/Tone] : grossly normal strength/tone [No Rash] : no rash [Coordination Grossly Intact] : coordination grossly intact [No Focal Deficits] : no focal deficits [Normal Gait] : normal gait [Deep Tendon Reflexes (DTR)] : deep tendon reflexes were 2+ and symmetric [Speech Grossly Normal] : speech grossly normal [Normal Affect] : the affect was normal [Normal Mood] : the mood was normal [Normal Insight/Judgement] : insight and judgment were intact [de-identified] : obese [de-identified] : Tattoo left upper

## 2024-05-07 NOTE — ASSESSMENT
[FreeTextEntry1] : 33-year-old male with good general health who needs lifestyle changes with better diet exercise and weight loss.  Patient with daytime fatigue which continues with sleep study May 2022 mildly abnormal but with symptoms may need treatment.  Referral given to see sleep specialist.  IBS with GI symptoms improved with dietary changes  Patient does not use seat belts therefore recommend patient using a seatbelt every time he drives.  Influenza vaccine declined this year COVID vaccine x 2  Tdap UTD  Venipuncture done today in the office  Followup yearly and as needed

## 2024-11-18 ENCOUNTER — APPOINTMENT (OUTPATIENT)
Dept: ORTHOPEDIC SURGERY | Facility: CLINIC | Age: 34
End: 2024-11-18

## 2024-11-18 DIAGNOSIS — R10.32 LEFT LOWER QUADRANT PAIN: ICD-10-CM

## 2024-11-18 DIAGNOSIS — M79.605 PAIN IN LEFT LEG: ICD-10-CM

## 2024-11-18 DIAGNOSIS — M24.159 OTHER ARTICULAR CARTILAGE DISORDERS, UNSPECIFIED HIP: ICD-10-CM

## 2024-11-18 DIAGNOSIS — S76.019A STRAIN OF MUSCLE, FASCIA AND TENDON OF UNSPECIFIED HIP, INITIAL ENCOUNTER: ICD-10-CM

## 2024-11-18 PROCEDURE — 72100 X-RAY EXAM L-S SPINE 2/3 VWS: CPT

## 2024-11-18 PROCEDURE — 73502 X-RAY EXAM HIP UNI 2-3 VIEWS: CPT

## 2024-11-18 PROCEDURE — 99204 OFFICE O/P NEW MOD 45 MIN: CPT

## 2024-11-18 RX ORDER — MELOXICAM 15 MG/1
15 TABLET ORAL DAILY
Qty: 30 | Refills: 1 | Status: ACTIVE | COMMUNITY
Start: 2024-11-18 | End: 2025-01-17

## 2024-11-19 ENCOUNTER — APPOINTMENT (OUTPATIENT)
Dept: ORTHOPEDIC SURGERY | Facility: CLINIC | Age: 34
End: 2024-11-19

## 2024-11-23 ENCOUNTER — APPOINTMENT (OUTPATIENT)
Dept: MRI IMAGING | Facility: CLINIC | Age: 34
End: 2024-11-23

## 2024-11-23 PROCEDURE — 73721 MRI JNT OF LWR EXTRE W/O DYE: CPT | Mod: LT

## 2024-12-02 ENCOUNTER — APPOINTMENT (OUTPATIENT)
Dept: ORTHOPEDIC SURGERY | Facility: CLINIC | Age: 34
End: 2024-12-02
Payer: COMMERCIAL

## 2024-12-02 DIAGNOSIS — M81.8 OTHER OSTEOPOROSIS W/OUT CURRENT PATHOLOGICAL FRACTURE: ICD-10-CM

## 2024-12-02 PROCEDURE — 99214 OFFICE O/P EST MOD 30 MIN: CPT

## 2024-12-17 ENCOUNTER — APPOINTMENT (OUTPATIENT)
Dept: DERMATOLOGY | Facility: CLINIC | Age: 34
End: 2024-12-17
Payer: COMMERCIAL

## 2024-12-17 PROCEDURE — 99203 OFFICE O/P NEW LOW 30 MIN: CPT

## 2025-01-13 ENCOUNTER — APPOINTMENT (OUTPATIENT)
Dept: ORTHOPEDIC SURGERY | Facility: CLINIC | Age: 35
End: 2025-01-13
Payer: COMMERCIAL

## 2025-01-13 VITALS — BODY MASS INDEX: 32.93 KG/M2 | HEIGHT: 70 IN | WEIGHT: 230 LBS

## 2025-01-13 DIAGNOSIS — M81.8 OTHER OSTEOPOROSIS W/OUT CURRENT PATHOLOGICAL FRACTURE: ICD-10-CM

## 2025-01-13 PROCEDURE — 99214 OFFICE O/P EST MOD 30 MIN: CPT

## 2025-01-17 ENCOUNTER — APPOINTMENT (OUTPATIENT)
Dept: MRI IMAGING | Facility: CLINIC | Age: 35
End: 2025-01-17
Payer: COMMERCIAL

## 2025-01-17 PROCEDURE — 73721 MRI JNT OF LWR EXTRE W/O DYE: CPT | Mod: LT

## 2025-01-21 ENCOUNTER — RX RENEWAL (OUTPATIENT)
Age: 35
End: 2025-01-21

## 2025-01-27 ENCOUNTER — APPOINTMENT (OUTPATIENT)
Dept: ORTHOPEDIC SURGERY | Facility: CLINIC | Age: 35
End: 2025-01-27
Payer: COMMERCIAL

## 2025-01-27 DIAGNOSIS — M81.8 OTHER OSTEOPOROSIS W/OUT CURRENT PATHOLOGICAL FRACTURE: ICD-10-CM

## 2025-01-27 PROCEDURE — 99214 OFFICE O/P EST MOD 30 MIN: CPT

## 2025-07-22 ENCOUNTER — NON-APPOINTMENT (OUTPATIENT)
Age: 35
End: 2025-07-22